# Patient Record
Sex: MALE | Race: WHITE | NOT HISPANIC OR LATINO | Employment: OTHER | URBAN - METROPOLITAN AREA
[De-identification: names, ages, dates, MRNs, and addresses within clinical notes are randomized per-mention and may not be internally consistent; named-entity substitution may affect disease eponyms.]

---

## 2021-10-01 ENCOUNTER — APPOINTMENT (OUTPATIENT)
Dept: RADIOLOGY | Age: 61
End: 2021-10-01
Payer: COMMERCIAL

## 2021-10-01 DIAGNOSIS — R05.9 COUGH: ICD-10-CM

## 2021-10-01 PROCEDURE — 71046 X-RAY EXAM CHEST 2 VIEWS: CPT

## 2021-10-12 ENCOUNTER — HOSPITAL ENCOUNTER (OUTPATIENT)
Dept: RADIOLOGY | Age: 61
Discharge: HOME | End: 2021-10-12
Payer: COMMERCIAL

## 2021-10-12 DIAGNOSIS — R91.8 LUNG NODULES: ICD-10-CM

## 2021-10-12 PROCEDURE — 71260 CT THORAX DX C+: CPT

## 2021-10-12 PROCEDURE — G1004 CDSM NDSC: HCPCS

## 2021-10-12 RX ADMIN — IOHEXOL 85 ML: 350 INJECTION, SOLUTION INTRAVENOUS at 08:14

## 2021-10-20 ENCOUNTER — HOSPITAL ENCOUNTER (OUTPATIENT)
Dept: RADIOLOGY | Age: 61
Discharge: HOME | End: 2021-10-20
Payer: COMMERCIAL

## 2021-10-20 DIAGNOSIS — E04.9 ENLARGEMENT OF THYROID: ICD-10-CM

## 2021-10-20 PROCEDURE — 76536 US EXAM OF HEAD AND NECK: CPT

## 2021-11-04 PROBLEM — H61.23 BILATERAL IMPACTED CERUMEN: Status: ACTIVE | Noted: 2021-11-04

## 2021-11-04 PROBLEM — E04.2 MULTINODULAR THYROID: Status: ACTIVE | Noted: 2021-11-04

## 2021-11-05 ENCOUNTER — TELEPHONE (OUTPATIENT)
Dept: HEMATOLOGY ONCOLOGY | Facility: CLINIC | Age: 61
End: 2021-11-05

## 2021-11-16 ENCOUNTER — OFFICE VISIT (OUTPATIENT)
Dept: CARDIAC SURGERY | Facility: CLINIC | Age: 61
End: 2021-11-16
Payer: COMMERCIAL

## 2021-11-16 VITALS
BODY MASS INDEX: 32.3 KG/M2 | OXYGEN SATURATION: 97 % | WEIGHT: 265.21 LBS | HEIGHT: 76 IN | TEMPERATURE: 97.2 F | HEART RATE: 67 BPM | DIASTOLIC BLOOD PRESSURE: 90 MMHG | SYSTOLIC BLOOD PRESSURE: 150 MMHG | RESPIRATION RATE: 16 BRPM

## 2021-11-16 DIAGNOSIS — E04.2 MULTINODULAR THYROID: Primary | ICD-10-CM

## 2021-11-16 PROCEDURE — 99205 OFFICE O/P NEW HI 60 MIN: CPT | Performed by: PHYSICIAN ASSISTANT

## 2021-12-06 ENCOUNTER — APPOINTMENT (OUTPATIENT)
Dept: LAB | Age: 61
End: 2021-12-06
Payer: COMMERCIAL

## 2021-12-06 DIAGNOSIS — E04.9 SUBSTERNAL GOITER: ICD-10-CM

## 2021-12-06 DIAGNOSIS — I10 ESSENTIAL HYPERTENSION, MALIGNANT: ICD-10-CM

## 2021-12-06 DIAGNOSIS — E04.9 ENLARGEMENT OF THYROID: ICD-10-CM

## 2021-12-06 DIAGNOSIS — E11.649 UNCONTROLLED TYPE 2 DIABETES MELLITUS WITH HYPOGLYCEMIA, UNSPECIFIED HYPOGLYCEMIA COMA STATUS (HCC): ICD-10-CM

## 2021-12-06 DIAGNOSIS — E78.5 HYPERLIPIDEMIA, UNSPECIFIED HYPERLIPIDEMIA TYPE: ICD-10-CM

## 2021-12-06 LAB
ALBUMIN SERPL BCP-MCNC: 3.7 G/DL (ref 3.5–5)
ALP SERPL-CCNC: 54 U/L (ref 46–116)
ALT SERPL W P-5'-P-CCNC: 44 U/L (ref 12–78)
ANION GAP SERPL CALCULATED.3IONS-SCNC: 7 MMOL/L (ref 4–13)
AST SERPL W P-5'-P-CCNC: 19 U/L (ref 5–45)
BASOPHILS # BLD AUTO: 0.06 THOUSANDS/ΜL (ref 0–0.1)
BASOPHILS NFR BLD AUTO: 1 % (ref 0–1)
BILIRUB SERPL-MCNC: 1.2 MG/DL (ref 0.2–1)
BUN SERPL-MCNC: 14 MG/DL (ref 5–25)
CALCIUM SERPL-MCNC: 10 MG/DL (ref 8.3–10.1)
CHLORIDE SERPL-SCNC: 102 MMOL/L (ref 100–108)
CHOLEST SERPL-MCNC: 131 MG/DL
CO2 SERPL-SCNC: 26 MMOL/L (ref 21–32)
CREAT SERPL-MCNC: 0.68 MG/DL (ref 0.6–1.3)
EOSINOPHIL # BLD AUTO: 0.24 THOUSAND/ΜL (ref 0–0.61)
EOSINOPHIL NFR BLD AUTO: 4 % (ref 0–6)
ERYTHROCYTE [DISTWIDTH] IN BLOOD BY AUTOMATED COUNT: 13 % (ref 11.6–15.1)
EST. AVERAGE GLUCOSE BLD GHB EST-MCNC: 134 MG/DL
GFR SERPL CREATININE-BSD FRML MDRD: 102 ML/MIN/1.73SQ M
GLUCOSE P FAST SERPL-MCNC: 169 MG/DL (ref 65–99)
HBA1C MFR BLD: 6.3 %
HCT VFR BLD AUTO: 42.5 % (ref 36.5–49.3)
HDLC SERPL-MCNC: 40 MG/DL
HGB BLD-MCNC: 15.1 G/DL (ref 12–17)
IMM GRANULOCYTES # BLD AUTO: 0.01 THOUSAND/UL (ref 0–0.2)
IMM GRANULOCYTES NFR BLD AUTO: 0 % (ref 0–2)
LDLC SERPL CALC-MCNC: 60 MG/DL (ref 0–100)
LYMPHOCYTES # BLD AUTO: 1.29 THOUSANDS/ΜL (ref 0.6–4.47)
LYMPHOCYTES NFR BLD AUTO: 23 % (ref 14–44)
MCH RBC QN AUTO: 31.2 PG (ref 26.8–34.3)
MCHC RBC AUTO-ENTMCNC: 35.5 G/DL (ref 31.4–37.4)
MCV RBC AUTO: 88 FL (ref 82–98)
MONOCYTES # BLD AUTO: 0.52 THOUSAND/ΜL (ref 0.17–1.22)
MONOCYTES NFR BLD AUTO: 9 % (ref 4–12)
NEUTROPHILS # BLD AUTO: 3.56 THOUSANDS/ΜL (ref 1.85–7.62)
NEUTS SEG NFR BLD AUTO: 63 % (ref 43–75)
NONHDLC SERPL-MCNC: 91 MG/DL
NRBC BLD AUTO-RTO: 0 /100 WBCS
PLATELET # BLD AUTO: 179 THOUSANDS/UL (ref 149–390)
PMV BLD AUTO: 11 FL (ref 8.9–12.7)
POTASSIUM SERPL-SCNC: 3.6 MMOL/L (ref 3.5–5.3)
PROT SERPL-MCNC: 7 G/DL (ref 6.4–8.2)
RBC # BLD AUTO: 4.84 MILLION/UL (ref 3.88–5.62)
SODIUM SERPL-SCNC: 135 MMOL/L (ref 136–145)
TRIGL SERPL-MCNC: 153 MG/DL
WBC # BLD AUTO: 5.68 THOUSAND/UL (ref 4.31–10.16)

## 2021-12-06 PROCEDURE — 36415 COLL VENOUS BLD VENIPUNCTURE: CPT

## 2021-12-06 PROCEDURE — 85025 COMPLETE CBC W/AUTO DIFF WBC: CPT

## 2021-12-06 PROCEDURE — 80053 COMPREHEN METABOLIC PANEL: CPT

## 2021-12-06 PROCEDURE — 83036 HEMOGLOBIN GLYCOSYLATED A1C: CPT

## 2021-12-06 PROCEDURE — 80061 LIPID PANEL: CPT

## 2021-12-16 ENCOUNTER — ANESTHESIA EVENT (OUTPATIENT)
Dept: PERIOP | Facility: HOSPITAL | Age: 61
End: 2021-12-16
Payer: COMMERCIAL

## 2021-12-17 ENCOUNTER — ANESTHESIA (OUTPATIENT)
Dept: PERIOP | Facility: HOSPITAL | Age: 61
End: 2021-12-17
Payer: COMMERCIAL

## 2021-12-17 ENCOUNTER — HOSPITAL ENCOUNTER (OUTPATIENT)
Facility: HOSPITAL | Age: 61
Setting detail: OUTPATIENT SURGERY
Discharge: HOME | End: 2021-12-18
Attending: SPECIALIST | Admitting: SPECIALIST
Payer: COMMERCIAL

## 2021-12-17 DIAGNOSIS — E04.9 SUBSTERNAL GOITER: Primary | ICD-10-CM

## 2021-12-17 DIAGNOSIS — I10 HYPERTENSION, UNSPECIFIED TYPE: ICD-10-CM

## 2021-12-17 PROBLEM — E66.811 CLASS 1 OBESITY IN ADULT: Status: ACTIVE | Noted: 2021-12-17

## 2021-12-17 PROBLEM — E78.5 HYPERLIPIDEMIA: Status: ACTIVE | Noted: 2021-12-17

## 2021-12-17 PROBLEM — E66.9 CLASS 1 OBESITY IN ADULT: Status: ACTIVE | Noted: 2021-12-17

## 2021-12-17 PROBLEM — E11.9 DIABETES MELLITUS, TYPE 2 (HCC): Status: ACTIVE | Noted: 2021-12-17

## 2021-12-17 LAB
GLUCOSE SERPL-MCNC: 155 MG/DL (ref 65–140)
GLUCOSE SERPL-MCNC: 155 MG/DL (ref 65–140)

## 2021-12-17 PROCEDURE — 60271 REMOVAL OF THYROID: CPT | Performed by: SPECIALIST

## 2021-12-17 PROCEDURE — 82948 REAGENT STRIP/BLOOD GLUCOSE: CPT

## 2021-12-17 PROCEDURE — 88307 TISSUE EXAM BY PATHOLOGIST: CPT | Performed by: PATHOLOGY

## 2021-12-17 RX ORDER — LIDOCAINE HYDROCHLORIDE 20 MG/ML
INJECTION, SOLUTION EPIDURAL; INFILTRATION; INTRACAUDAL; PERINEURAL AS NEEDED
Status: DISCONTINUED | OUTPATIENT
Start: 2021-12-17 | End: 2021-12-17

## 2021-12-17 RX ORDER — METOCLOPRAMIDE HYDROCHLORIDE 5 MG/ML
10 INJECTION INTRAMUSCULAR; INTRAVENOUS ONCE AS NEEDED
Status: DISCONTINUED | OUTPATIENT
Start: 2021-12-17 | End: 2021-12-17 | Stop reason: HOSPADM

## 2021-12-17 RX ORDER — NEOSTIGMINE METHYLSULFATE 1 MG/ML
INJECTION INTRAVENOUS AS NEEDED
Status: DISCONTINUED | OUTPATIENT
Start: 2021-12-17 | End: 2021-12-17

## 2021-12-17 RX ORDER — SUCCINYLCHOLINE/SOD CL,ISO/PF 100 MG/5ML
SYRINGE (ML) INTRAVENOUS AS NEEDED
Status: DISCONTINUED | OUTPATIENT
Start: 2021-12-17 | End: 2021-12-17

## 2021-12-17 RX ORDER — SODIUM CHLORIDE, SODIUM LACTATE, POTASSIUM CHLORIDE, CALCIUM CHLORIDE 600; 310; 30; 20 MG/100ML; MG/100ML; MG/100ML; MG/100ML
75 INJECTION, SOLUTION INTRAVENOUS CONTINUOUS
Status: DISCONTINUED | OUTPATIENT
Start: 2021-12-17 | End: 2021-12-17

## 2021-12-17 RX ORDER — LIDOCAINE HYDROCHLORIDE AND EPINEPHRINE 10; 10 MG/ML; UG/ML
INJECTION, SOLUTION INFILTRATION; PERINEURAL AS NEEDED
Status: DISCONTINUED | OUTPATIENT
Start: 2021-12-17 | End: 2021-12-17 | Stop reason: HOSPADM

## 2021-12-17 RX ORDER — IBUPROFEN 400 MG/1
400 TABLET ORAL EVERY 6 HOURS PRN
Status: DISCONTINUED | OUTPATIENT
Start: 2021-12-17 | End: 2021-12-18 | Stop reason: HOSPADM

## 2021-12-17 RX ORDER — OXYCODONE HYDROCHLORIDE 10 MG/1
10 TABLET ORAL EVERY 4 HOURS PRN
Status: DISCONTINUED | OUTPATIENT
Start: 2021-12-17 | End: 2021-12-18 | Stop reason: HOSPADM

## 2021-12-17 RX ORDER — ONDANSETRON 2 MG/ML
INJECTION INTRAMUSCULAR; INTRAVENOUS AS NEEDED
Status: DISCONTINUED | OUTPATIENT
Start: 2021-12-17 | End: 2021-12-17

## 2021-12-17 RX ORDER — SODIUM CHLORIDE, SODIUM LACTATE, POTASSIUM CHLORIDE, CALCIUM CHLORIDE 600; 310; 30; 20 MG/100ML; MG/100ML; MG/100ML; MG/100ML
125 INJECTION, SOLUTION INTRAVENOUS CONTINUOUS
Status: DISCONTINUED | OUTPATIENT
Start: 2021-12-17 | End: 2021-12-17

## 2021-12-17 RX ORDER — FENTANYL CITRATE/PF 50 MCG/ML
25 SYRINGE (ML) INJECTION
Status: DISCONTINUED | OUTPATIENT
Start: 2021-12-17 | End: 2021-12-17 | Stop reason: HOSPADM

## 2021-12-17 RX ORDER — DEXAMETHASONE SODIUM PHOSPHATE 10 MG/ML
INJECTION, SOLUTION INTRAMUSCULAR; INTRAVENOUS AS NEEDED
Status: DISCONTINUED | OUTPATIENT
Start: 2021-12-17 | End: 2021-12-17

## 2021-12-17 RX ORDER — GLYCOPYRROLATE 0.2 MG/ML
INJECTION INTRAMUSCULAR; INTRAVENOUS AS NEEDED
Status: DISCONTINUED | OUTPATIENT
Start: 2021-12-17 | End: 2021-12-17

## 2021-12-17 RX ORDER — EPHEDRINE SULFATE 50 MG/ML
INJECTION INTRAVENOUS AS NEEDED
Status: DISCONTINUED | OUTPATIENT
Start: 2021-12-17 | End: 2021-12-17

## 2021-12-17 RX ORDER — FENTANYL CITRATE 50 UG/ML
INJECTION, SOLUTION INTRAMUSCULAR; INTRAVENOUS AS NEEDED
Status: DISCONTINUED | OUTPATIENT
Start: 2021-12-17 | End: 2021-12-17

## 2021-12-17 RX ORDER — LIDOCAINE HYDROCHLORIDE 10 MG/ML
0.5 INJECTION, SOLUTION EPIDURAL; INFILTRATION; INTRACAUDAL; PERINEURAL ONCE AS NEEDED
Status: DISCONTINUED | OUTPATIENT
Start: 2021-12-17 | End: 2021-12-17 | Stop reason: HOSPADM

## 2021-12-17 RX ORDER — B-COMPLEX WITH VITAMIN C
2 TABLET ORAL
Status: DISCONTINUED | OUTPATIENT
Start: 2021-12-17 | End: 2021-12-18 | Stop reason: HOSPADM

## 2021-12-17 RX ORDER — PROPOFOL 10 MG/ML
INJECTION, EMULSION INTRAVENOUS AS NEEDED
Status: DISCONTINUED | OUTPATIENT
Start: 2021-12-17 | End: 2021-12-17

## 2021-12-17 RX ORDER — ROCURONIUM BROMIDE 10 MG/ML
INJECTION, SOLUTION INTRAVENOUS AS NEEDED
Status: DISCONTINUED | OUTPATIENT
Start: 2021-12-17 | End: 2021-12-17

## 2021-12-17 RX ORDER — HYDROMORPHONE HCL/PF 1 MG/ML
0.5 SYRINGE (ML) INJECTION
Status: DISCONTINUED | OUTPATIENT
Start: 2021-12-17 | End: 2021-12-17 | Stop reason: HOSPADM

## 2021-12-17 RX ORDER — MAGNESIUM HYDROXIDE 1200 MG/15ML
LIQUID ORAL AS NEEDED
Status: DISCONTINUED | OUTPATIENT
Start: 2021-12-17 | End: 2021-12-17 | Stop reason: HOSPADM

## 2021-12-17 RX ORDER — ACETAMINOPHEN 325 MG/1
650 TABLET ORAL EVERY 6 HOURS PRN
Status: DISCONTINUED | OUTPATIENT
Start: 2021-12-17 | End: 2021-12-18 | Stop reason: HOSPADM

## 2021-12-17 RX ORDER — MIDAZOLAM HYDROCHLORIDE 2 MG/2ML
INJECTION, SOLUTION INTRAMUSCULAR; INTRAVENOUS AS NEEDED
Status: DISCONTINUED | OUTPATIENT
Start: 2021-12-17 | End: 2021-12-17

## 2021-12-17 RX ORDER — GINSENG 100 MG
1 CAPSULE ORAL 2 TIMES DAILY
Status: DISCONTINUED | OUTPATIENT
Start: 2021-12-17 | End: 2021-12-18 | Stop reason: HOSPADM

## 2021-12-17 RX ADMIN — BACITRACIN ZINC 1 SMALL APPLICATION: 500 OINTMENT TOPICAL at 18:37

## 2021-12-17 RX ADMIN — FENTANYL CITRATE 25 MCG: 50 INJECTION INTRAMUSCULAR; INTRAVENOUS at 13:23

## 2021-12-17 RX ADMIN — GLYCOPYRROLATE 0.4 MG: 0.2 INJECTION, SOLUTION INTRAMUSCULAR; INTRAVENOUS at 12:37

## 2021-12-17 RX ADMIN — NEOSTIGMINE METHYLSULFATE 3 MG: 1 INJECTION INTRAVENOUS at 12:37

## 2021-12-17 RX ADMIN — MIDAZOLAM HYDROCHLORIDE 2 MG: 1 INJECTION, SOLUTION INTRAMUSCULAR; INTRAVENOUS at 10:22

## 2021-12-17 RX ADMIN — ROCURONIUM BROMIDE 5 MG: 10 SOLUTION INTRAVENOUS at 10:32

## 2021-12-17 RX ADMIN — DEXAMETHASONE SODIUM PHOSPHATE 10 MG: 10 INJECTION, SOLUTION INTRAMUSCULAR; INTRAVENOUS at 10:32

## 2021-12-17 RX ADMIN — FENTANYL CITRATE 100 MCG: 50 INJECTION, SOLUTION INTRAMUSCULAR; INTRAVENOUS at 10:32

## 2021-12-17 RX ADMIN — ONDANSETRON 4 MG: 2 INJECTION INTRAMUSCULAR; INTRAVENOUS at 10:32

## 2021-12-17 RX ADMIN — EPHEDRINE SULFATE 10 MG: 50 INJECTION, SOLUTION INTRAVENOUS at 10:55

## 2021-12-17 RX ADMIN — EPHEDRINE SULFATE 10 MG: 50 INJECTION, SOLUTION INTRAVENOUS at 11:09

## 2021-12-17 RX ADMIN — Medication 200 MG: at 10:32

## 2021-12-17 RX ADMIN — METFORMIN HYDROCHLORIDE 1000 MG: 500 TABLET, FILM COATED ORAL at 18:35

## 2021-12-17 RX ADMIN — ENOXAPARIN SODIUM 40 MG: 40 INJECTION SUBCUTANEOUS at 17:26

## 2021-12-17 RX ADMIN — FENTANYL CITRATE 25 MCG: 50 INJECTION INTRAMUSCULAR; INTRAVENOUS at 13:19

## 2021-12-17 RX ADMIN — EPHEDRINE SULFATE 10 MG: 50 INJECTION, SOLUTION INTRAVENOUS at 11:36

## 2021-12-17 RX ADMIN — LIDOCAINE HYDROCHLORIDE 100 MG: 20 INJECTION, SOLUTION EPIDURAL; INFILTRATION; INTRACAUDAL at 10:32

## 2021-12-17 RX ADMIN — ROCURONIUM BROMIDE 30 MG: 10 SOLUTION INTRAVENOUS at 10:45

## 2021-12-17 RX ADMIN — SODIUM CHLORIDE, SODIUM LACTATE, POTASSIUM CHLORIDE, AND CALCIUM CHLORIDE: .6; .31; .03; .02 INJECTION, SOLUTION INTRAVENOUS at 10:12

## 2021-12-17 RX ADMIN — SODIUM CHLORIDE, SODIUM LACTATE, POTASSIUM CHLORIDE, AND CALCIUM CHLORIDE: .6; .31; .03; .02 INJECTION, SOLUTION INTRAVENOUS at 10:35

## 2021-12-17 RX ADMIN — Medication 2 TABLET: at 18:36

## 2021-12-17 RX ADMIN — PROPOFOL 200 MG: 10 INJECTION, EMULSION INTRAVENOUS at 10:32

## 2021-12-18 VITALS
WEIGHT: 265 LBS | SYSTOLIC BLOOD PRESSURE: 142 MMHG | HEIGHT: 76 IN | BODY MASS INDEX: 32.27 KG/M2 | TEMPERATURE: 98 F | RESPIRATION RATE: 18 BRPM | OXYGEN SATURATION: 95 % | DIASTOLIC BLOOD PRESSURE: 67 MMHG | HEART RATE: 62 BPM

## 2021-12-18 RX ORDER — LEVOTHYROXINE SODIUM 0.15 MG/1
150 TABLET ORAL DAILY
Qty: 30 TABLET | Refills: 0 | Status: SHIPPED | OUTPATIENT
Start: 2021-12-18 | End: 2022-01-10 | Stop reason: SDUPTHER

## 2021-12-18 RX ORDER — ACETAMINOPHEN 325 MG/1
TABLET ORAL
Qty: 30 TABLET | Refills: 0
Start: 2021-12-18

## 2021-12-18 RX ORDER — IBUPROFEN 200 MG
400 TABLET ORAL EVERY 6 HOURS PRN
Qty: 120 TABLET | Refills: 0
Start: 2021-12-18 | End: 2022-01-17

## 2021-12-18 RX ORDER — ASPIRIN 81 MG/1
81 TABLET ORAL DAILY
Refills: 0
Start: 2021-12-20

## 2021-12-18 RX ORDER — B-COMPLEX WITH VITAMIN C
2 TABLET ORAL 3 TIMES DAILY
Qty: 180 TABLET | Refills: 0 | Status: SHIPPED | OUTPATIENT
Start: 2021-12-18 | End: 2022-01-17

## 2021-12-18 RX ADMIN — Medication 2 TABLET: at 11:12

## 2021-12-18 RX ADMIN — BACITRACIN ZINC 1 SMALL APPLICATION: 500 OINTMENT TOPICAL at 09:29

## 2021-12-18 RX ADMIN — OXYCODONE HYDROCHLORIDE 10 MG: 10 TABLET ORAL at 11:12

## 2021-12-18 RX ADMIN — ENOXAPARIN SODIUM 40 MG: 40 INJECTION SUBCUTANEOUS at 09:29

## 2021-12-18 RX ADMIN — Medication 2 TABLET: at 07:39

## 2021-12-18 RX ADMIN — METFORMIN HYDROCHLORIDE 1000 MG: 500 TABLET, FILM COATED ORAL at 07:39

## 2021-12-18 RX ADMIN — OXYCODONE HYDROCHLORIDE 10 MG: 10 TABLET ORAL at 07:39

## 2021-12-23 PROBLEM — E89.0 POST-OPERATIVE HYPOTHYROIDISM: Status: ACTIVE | Noted: 2021-12-23

## 2022-01-10 DIAGNOSIS — E04.9 SUBSTERNAL GOITER: ICD-10-CM

## 2022-01-10 RX ORDER — LEVOTHYROXINE SODIUM 0.15 MG/1
150 TABLET ORAL DAILY
Qty: 30 TABLET | Refills: 0 | Status: SHIPPED | OUTPATIENT
Start: 2022-01-10 | End: 2022-01-31 | Stop reason: DRUGHIGH

## 2022-01-28 ENCOUNTER — APPOINTMENT (OUTPATIENT)
Dept: LAB | Age: 62
End: 2022-01-28
Payer: COMMERCIAL

## 2022-01-28 DIAGNOSIS — E89.0 POST-OPERATIVE HYPOTHYROIDISM: ICD-10-CM

## 2022-01-28 LAB
T4 FREE SERPL-MCNC: 1 NG/DL (ref 0.76–1.46)
T4 SERPL-MCNC: 9.3 UG/DL (ref 4.7–13.3)
TSH SERPL DL<=0.05 MIU/L-ACNC: 5.43 UIU/ML (ref 0.36–3.74)

## 2022-01-28 PROCEDURE — 84439 ASSAY OF FREE THYROXINE: CPT

## 2022-01-28 PROCEDURE — 84443 ASSAY THYROID STIM HORMONE: CPT

## 2022-01-28 PROCEDURE — 36415 COLL VENOUS BLD VENIPUNCTURE: CPT

## 2022-01-31 DIAGNOSIS — E89.0 POST-OPERATIVE HYPOTHYROIDISM: Primary | ICD-10-CM

## 2022-01-31 DIAGNOSIS — E83.51 HYPOCALCEMIA: ICD-10-CM

## 2022-01-31 DIAGNOSIS — E55.9 VITAMIN D DEFICIENCY: ICD-10-CM

## 2022-01-31 RX ORDER — LEVOTHYROXINE SODIUM 175 UG/1
175 TABLET ORAL DAILY
Qty: 30 TABLET | Refills: 2 | Status: SHIPPED | OUTPATIENT
Start: 2022-01-31 | End: 2022-02-23 | Stop reason: SDUPTHER

## 2022-01-31 NOTE — PROGRESS NOTES
Discussed with pt via phone  TSH level elevated 5 4  To increase Levothyroxine dose  Repeat level in 6 to 8 weeks  He continues on calcium supplement    Check calcium level next lab draw

## 2022-03-13 ENCOUNTER — APPOINTMENT (OUTPATIENT)
Dept: LAB | Age: 62
End: 2022-03-13
Payer: COMMERCIAL

## 2022-03-13 DIAGNOSIS — E55.9 VITAMIN D DEFICIENCY: ICD-10-CM

## 2022-03-13 DIAGNOSIS — E89.0 POST-OPERATIVE HYPOTHYROIDISM: ICD-10-CM

## 2022-03-13 DIAGNOSIS — E83.51 HYPOCALCEMIA: ICD-10-CM

## 2022-03-13 LAB
25(OH)D3 SERPL-MCNC: 43.3 NG/ML (ref 30–100)
CALCIUM SERPL-MCNC: 9.2 MG/DL (ref 8.3–10.1)
PTH-INTACT SERPL-MCNC: 18.9 PG/ML (ref 18.4–80.1)
T4 FREE SERPL-MCNC: 1.05 NG/DL (ref 0.76–1.46)
T4 SERPL-MCNC: 9.1 UG/DL (ref 4.7–13.3)
TSH SERPL DL<=0.05 MIU/L-ACNC: 4.6 UIU/ML (ref 0.36–3.74)

## 2022-03-13 PROCEDURE — 36415 COLL VENOUS BLD VENIPUNCTURE: CPT

## 2022-03-13 PROCEDURE — 84439 ASSAY OF FREE THYROXINE: CPT

## 2022-03-13 PROCEDURE — 84443 ASSAY THYROID STIM HORMONE: CPT

## 2022-03-13 PROCEDURE — 83970 ASSAY OF PARATHORMONE: CPT

## 2022-03-13 PROCEDURE — 82310 ASSAY OF CALCIUM: CPT

## 2022-03-13 PROCEDURE — 82306 VITAMIN D 25 HYDROXY: CPT

## 2022-04-05 ENCOUNTER — EVALUATION (OUTPATIENT)
Dept: SPEECH THERAPY | Facility: CLINIC | Age: 62
End: 2022-04-05
Payer: COMMERCIAL

## 2022-04-05 DIAGNOSIS — R49.0 HOARSENESS: ICD-10-CM

## 2022-04-05 DIAGNOSIS — R49.0 DYSPHONIA: Primary | ICD-10-CM

## 2022-04-05 DIAGNOSIS — J38.01 PARALYSIS OF LEFT VOCAL CORD: ICD-10-CM

## 2022-04-05 PROCEDURE — 92524 BEHAVRAL QUALIT ANALYS VOICE: CPT

## 2022-04-05 PROCEDURE — 92507 TX SP LANG VOICE COMM INDIV: CPT

## 2022-04-05 NOTE — PROGRESS NOTES
Speech-Language Pathology Initial Evaluation    Today's date: 2022  Patients name: Kyler Swann  : 1960  MRN: [id number]  Safety measures: N/A  Referring provider: Brian Alfonso MD    Primary diagnosis/billing code: R 49 0  Secondary diagnosis/billing code: J38 01    Visit tracking:  -Referring provider: Epic  -Billing guidelines: AMA  -Visit #1  -Insurance: [employer] 81  due 2022    Subjective comments:   Patient is accompanied by his wife, Elias Hirsch today  Patient has experienced hoarseness following thyroidectomy (21)  The ENT told him that his left vocal fold is still paralysed  Wife reports that it appears as if he has trouble catching his breath at times, particularly when he is talking too long  His doctor is going to start him on a new blood pressure medication (in two weeks) which he has noticed has helped other patients in resolution of nerve damage (resulting in vocal cord paralysis)  The medication is called niMODipine  How did the patient hear about us? Physician    Patient's goal(s): To get my voice back to normal     Reason for referral: Change in vocal quality  Prior functional status: Communication effective and appropriate in all situations  Clinically complex situations: N/A    History: Patient is a 59 y o  male who was referred to outpatient skilled Speech Therapy services for a voice evaluation  Patient underwent a total thyroidectomy on 21  Upon follow-up laryngoscopy (3/31/22), patient demonstrated good mobility of right true vocal fold with mild edema and left true vocal fold paralysis  Patient has since experienced resulting hoarseness  Prior medical history of the following: multinodular thyroid, hypertension and diabetes melitus (type II)      Hearing: WFL  Vision: Life long glass wearer    Home environment/lifestyle: Lives with wife  Highest level of education: college  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, work around the house    Mental status: Alert  Behavior status: Cooperative  Communication modalities: Verbal  Rehabilitation prognosis: Good rehab potential to reach the established goals    Assessments    VOICE EVALUATION:  -Chief complaint: Hoarseness    -Onset: Sudden (beginnin21) - voice is the same, perhaps "just a little bit worse" following thyroidectomy    -Voice history: Allergies, Thyroid problems and Surgeries (total thyroidectomy)     -Occupational/vocal demands: retired    -Water intake: 32-48 oz    -Caffeine intake: coffee - 3 cups in the morning (24 oz)    -Alcohol intake: 10 beers in a week    -Smoking?: Quite over 20 years ago    -Throat clearing/coughing?: once a day throat clearing    -Previous voice tx?: None    -Other History - N/A      1  Voice Handicap Index (VHI): The VHI is a list of 30 statements that many people have used to describe their voices and the effects of their voices on their lives  Patient indicated how frequently he has the same experience using a rating point scale (never = 0, almost never = 1, sometimes = 2, almost always = 3, and always = 4)  Patient's results were as follows:    Subscale: Score: Self-Perceived Impairment Level:   Physical 18/40 Moderate   Functional 17/40 Moderate   Emotional 9/40 Mild        TOTAL 44/120 Moderate       PERCEPTUAL VOICE ASSESSMENT:    2  Consensus Auditory Perceptual Evaluation of Voice (CAPE-V): The CAPE-V rates auditory-perceptual qualities of a patients voice  The overall severity, roughness, breathiness, strain, pitch and loudness are rated during several tasks including general conversation, vowel prolongation, and reading of sentences  Patient also read the [place] to assess the coordination of respiration and voice production   The ratings of the abovementioned parameters were plotted on a 100-millimeter scale, with 0 corresponding to typical normal voice and 100 indicating a severely deviant voice  Parameter: Rating: Severity & Perceptual Observations:   *Overall Severity Roughness 56/100 moderate to severe   Roughness 61/100 moderate to severe   Breathiness 12/100 Mild   Strain 44/100 Moderate   Pitch 26/100 Mild to moderate   Loudness 26/100 Mild to Moderate   Focus of Resonance normal WNL       RESPIRATORY EFFICIENCY:   3  S:Z Ratio Task: Patient was instructed to sustain the sounds /s/ and /z/ across three trials to examine the coordination and efficiency of respiration and voice production  Normative data suggests that adults can prolong these sounds for 20-25 seconds  Ratios of 1 4 and above are consistent with laryngeal inefficiency, and ratios of 2 0 and above are suggestive of vocal fold pathology  Task: Trial 1 (sec): Trial 2 (sec): Trial 3 (sec):   /s/ 14 12 16 05 13 03   /z/ 6 35 6 72 6 89     Best /s/: 16 05  Best /z/: 6 89      Ratio: 2 33      4  Maximum Phonation Time: Patient was instructed to sustain the sound /ah/ across three trials to measure respiratory and laryngeal coordination and efficiency  Adults are typically able to prolong these vowels sound for 15-20 seconds  Reduced MPT may suggest poor respiratory support, or poor medial glottal closure  Trial 1 (sec): Trial 2 (sec): Trial 3 (sec):   3 08 3 57 4 13     Average Duration: 3 59 seconds      MEASURES OF PITCH:  The Visi-Pitch IV, a computer-driven voice analysis program, was used to collect objective voice data using the Visi-Pitch Multidimensional Voice Program (MDVP) & Real Time Pitch Program (MTPP)  5  Multi-Dimensional Voice Profile (MDVP):  This is obtained on the phonation of the sound /a/, in which dimensions of the voice, including frequncy perturbations, amplitude perturbations, variations in F0, noise to harmonic ratio, voice turbulence Index, soft phonation Index, tremours in frequency and amplitude, degree of subharmonics, and degree of voicing apart from the frequency and amplitude, are reflected  Normative Data:   Mean Fundamental Frequency (F0) 129 825 Hz 145 22 Hz   Jitter (RAP) 5 886% 0 345%   Shimmer 38 919% 2 523%   Iildv-am-Xgzyopo[place] Ratio (NHR) 0 508 0 122       6  Habitual Pitch: Patient was instructed to engage in two different speaking tasks (counting and reading) to calculate the pitch he uses most frequently  Task: Mean F0 (Hz) Min-Max Range (Hz) Normative Data:   Speaking (counting 1-10) 130 81 Hz 94 8 Hz -259 75 Hz 128 Hz (100 Hz -150 Hz)   Reading ("[place]) 148 42 Hz 70 94 Hz -257 85 Hz 128 Hz (100 Hz -150 Hz)       7  Maximal Phonational Frequency Range: Patient was instructed to voice from lowest to highest notes  Task Min-Max Range (Hz) Normative Data:   Gliding 67 22 Hz -218 11 Hz 77 Hz-576 Hz           Goals    Short-term goals:  Patient will be educated on vocal hygiene (increase H2O, decrease caffeine and alcohol intake) and demonstrate understanding of recommendations to facilitate increased quality of voice, to be achieved in 2-4 weeks  Patient will decrease laryngeal and articulatory muscle tension by independently completing relaxation/stretching exercises, to be achieved in 6-8 weeks  Patient will be educated on diaphragmatic breathing (versus clavicular breathing) to establish controlled, rhythmic breathing, to be achieved in 2-4 weeks  Patient will utilize diaphragmatic breathing during oral sentence/paragraph reading in 80% of opportunities to facilitate increased breath support for voice/speaking, decrease laryngeal effort, and improve glottic closure to be achieved in 4-6 weeks  Patient will demonstrate the ability to discriminate the target voice (coordinated voice production) from the uncoordinated voice by counting, reading, speaking in phrases and rating each voice production correctly (coordinated vs uncoordinated voice) with 80% accuracy       Patient will utilize semi-occluded vocal tract exercises without laryngeal tension to promote healthy vocal function with 80% accuracy, to be achieved in 4-6 weeks  Patient will utilize a forward tone focused/resonant voice to decrease vocal hyperfunction and improve voice quality and vocal projection, to be achieved in 4-6 weeks  Patient will produce easy resonant voice / flow phonation on phonemes, words, phrases and chanting with 80% accuracy, to be achieved in 4-6 weeks  Patient will increase loudness using RV or Flow Phonation with messa di voce in trials with 80% accuracy, to be achieved in 4-6 weeks  Patient will produce coordinated voice production in different situations (e g  telephone, over background noise, emotional topics, speaking at a distance) with 80% accuracy as perceptually judged by clinician  Long-term goals:    Patient will improve vocal quality for increased functional communication by discharge  To maximize vocal effectiveness 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 and to return to vocal activities of daily living  Impressions/Recommendations    Impressions:   -Patient presents with a moderate to severely dysphonic voice characterized by roughness, strain, breathiness, pitch breaks and reduced loudness  Patient's breathing is clavicular/thoracic in nature, requiring education in diaphragmatic breathing  Diagnostic measurements support patient's diagnosis of left vocal fold paralysis as maximum phonation time is below normal limits, s/z ratio is high, and diagnostic parameters (e g  noise to harmonic ratio, jitter and shimmer) are not within standard norms  Voice therapy is recommended to reduce laryngeal tension, improve glottal closure, produce forward focused resonance and provide respiratory retraining for connected speech  Patient was educated on various phonotraumatic behaviors and vocal hygiene throughout assessment   Patient currently demonstrates decreased water and increased caffeine and alcohol intake, thoracic/clavicular breathing, and straining voice  Vocal hygiene strategies included increased water intake, decreased caffeine and alcohol intake, and increased breath support/diaphragmatic breathing  Patient was agreeable  Recommendations:  -Patient would benefit from outpatient skilled Speech Therapy services: Voice therapy    -Frequency: 1x weekly  -Duration: 6-8 weeks    -Intervention certification from: 6/8/6799  -Intervention certification to: 6/1/7742    -Intervention comments:   40 minutes of voice evaluation time; 20 minutes of patient and family education/speech therapy

## 2022-04-14 NOTE — PROGRESS NOTES
Daily Speech Treatment Note    Today's date: 2022  Patients name: Shannan Richards  : 1960  MRN: [id number]  Safety measures: N/A  Referring provider: Jessica Mcclain MD     Primary diagnosis/billing code: R 49 0  Secondary diagnosis/billing code: J38 01     Visit tracking:  -Referring provider: Epic  -Billing guidelines: AMA  -Visit #2  -Insurance: [employer] 81  due 2022    Subjective/Behavioral:  - Patient reports that he's been talking a lot and feels that his voice seems better  He has been practicing breathing but hasn't noticed much change  Objective/Assessment:  -Patient initially had difficulty finding forward focused resonance  Benefitted from Flow Phonation and relaxation exercises (neck and shoulder) to relieve tension and encourage easy onset phonation  He was able to find forward focused resonance (in his nose and eventually some feeling in lips)utilizing straw phonation (wide straw) Resonant Voice Therapy at phoneme level  Short-term goals:  Patient will be educated on vocal hygiene (increase H2O, decrease caffeine and alcohol intake) and demonstrate understanding of recommendations to facilitate increased quality of voice, to be achieved in 2-4 weeks  H2O - increased to over 64 oz  Caffeine - no change in intake (not intending to change)  Alcohol - significant over the past two weeks on his cruise     Patient will decrease laryngeal and articulatory muscle tension by independently completing relaxation/stretching exercises, to be achieved in 6-8 weeks  Patient was lead through a series of neck/shoulder exercises to reduce laryngeal tension  Performed with 100% accuracy  Patient will be educated on diaphragmatic breathing (versus clavicular breathing) to establish controlled, rhythmic breathing, to be achieved in 2-4 weeks  Diaphragmatic breathing was reviewed  Patient practiced on exhalation of sustained /s/   Tactile cueing was provided (e g  patient stood with back against wall and hand on abdomen)  Patient demonstrated engagement of abdominal muscles, in 70% of trials  Patient will utilize diaphragmatic breathing during oral sentence/paragraph reading in 80% of opportunities to facilitate increased breath support for voice/speaking, decrease laryngeal effort, and improve glottic closure to be achieved in 4-6 weeks      Patient will demonstrate the ability to discriminate the target voice (coordinated voice production) from the uncoordinated voice by counting, reading, speaking in phrases and rating each voice production correctly (coordinated vs uncoordinated voice) with 80% accuracy       Patient will utilize semi-occluded vocal tract exercises without laryngeal tension to promote healthy vocal function with 80% accuracy, to be achieved in 4-6 weeks     Semi-Occluded Vocal Tract Exercises  To target relaxed laryngeal posture and coordination between phonation and respiration, the following activities were completed using principles of SOVTE  Cup Bubbling (straw and cup with water): Without phonation: 100% accuracy  With phonation (humming):  Patient was not able to achieve coordination of breath with voice  Switched to wide straw phonation without cup  Straw Only  With phonation (humming):  Patient was able to achieve coordination of breath with voice 50% accuracy  Patient communicated that he felt it in the nose  As the therapy session continued, he was eventually able to feel some vibration in his lips  Patient will utilize a forward tone focused/resonant voice to decrease vocal hyperfunction and improve voice quality and vocal projection, to be achieved in 4-6 weeks  Patient demonstrated a forward focused, coordinated voice with 50% at phoneme level on /m/  Patient demonstrated a forward focused, coordinated voice with 50% on "mum"      Patient demonstrated a forward focused, coordinated voice with 50% on "mo"& "ma"     Intermittently utilized straw phonation to encourage forward focused placement  This was a successful strategy for the patient       Patient will produce easy resonant voice / flow phonation on phonemes, words, phrases and chanting with 80% accuracy, to be achieved in 4-6 weeks    Flow Phonation:  Phoneme (voiceless): 100% accuracy    Phoneme Level (voiced): 100% accuracy    Word Level (voiced): 70%    Phrase Level (voiced): 50%    Patient will increase loudness using RV or Flow Phonation with messa di voce in trials with 80% accuracy, to be achieved in 4-6 weeks       Patient will produce coordinated voice production in different situations (e g  telephone, over background noise, emotional topics, speaking at a distance) with 80% accuracy as perceptually judged by clinician  Plan:  -Patient was provided with home exercises/activities to target goals in plan of care at the end of today's session   -Continue with current plan of care

## 2022-04-19 ENCOUNTER — OFFICE VISIT (OUTPATIENT)
Dept: SPEECH THERAPY | Facility: CLINIC | Age: 62
End: 2022-04-19
Payer: COMMERCIAL

## 2022-04-19 DIAGNOSIS — R49.0 HOARSENESS: ICD-10-CM

## 2022-04-19 DIAGNOSIS — R49.0 DYSPHONIA: Primary | ICD-10-CM

## 2022-04-19 DIAGNOSIS — J38.01 PARALYSIS OF LEFT VOCAL CORD: ICD-10-CM

## 2022-04-19 PROCEDURE — 92507 TX SP LANG VOICE COMM INDIV: CPT

## 2022-04-25 NOTE — PROGRESS NOTES
Daily Speech Treatment Note    Today's date: 2022  Patients name: Vaishali Campbell  : 1960  MRN: [id number]  Safety measures: N/A  Referring provider: Negrita Bryant MD     Primary diagnosis/billing code: R 49 0  Secondary diagnosis/billing code: J38 01     Visit tracking:  -Referring provider: Epic  -Billing guidelines: AMA  -Visit #3  -[facility]  due 2022    Subjective/Behavioral:  - Patient reports that he is doing all right  No changes in voice  Practicing daily exercises and find most success with RVT  Objective/Assessment:  -Patient demonstrated improvement in producing forward focused resonance, aided by daily practice of resonance exercises  Patient demonstrated the ability to lower his pitch and produce forward focused vowel sounds at the 50% to 90% accuracy levels  RVT at phoneme and word level will be targeted during his next session  Short-term goals:  Patient will be educated on vocal hygiene (increase H2O, decrease caffeine and alcohol intake) and demonstrate understanding of recommendations to facilitate increased quality of voice, to be achieved in 2-4 weeks  H2O - Over 64 oz  Caffeine - remaining the same  Alcohol - down from vacation - 6 to 7 beers in a week (more on weekends)      Patient will decrease laryngeal and articulatory muscle tension by independently completing relaxation/stretching exercises, to be achieved in 6-8 weeks  Patient was lead through a series of neck/shoulder exercises to reduce laryngeal tension  Performed with 100% accuracy  Required min reminders to keep shoulders relaxed  Patient will be educated on diaphragmatic breathing (versus clavicular breathing) to establish controlled, rhythmic breathing, to be achieved in 2-4 weeks  Diaphragmatic breathing was reviewed  Patient practiced on exhalation of sustained /s/   Tactile cueing was provided (e g  patient stood with back against wall and hand on abdomen)  Patient demonstrated engagement of abdominal muscles, in 90% of trials  16 67 seconds max exhalation  Patient will utilize diaphragmatic breathing during oral sentence/paragraph reading in 80% of opportunities to facilitate increased breath support for voice/speaking, decrease laryngeal effort, and improve glottic closure to be achieved in 4-6 weeks      Patient will demonstrate the ability to discriminate the target voice (coordinated voice production) from the uncoordinated voice by counting, reading, speaking in phrases and rating each voice production correctly (coordinated vs uncoordinated voice) with 80% accuracy       Patient will utilize semi-occluded vocal tract exercises without laryngeal tension to promote healthy vocal function with 80% accuracy, to be achieved in 4-6 weeks     Semi-Occluded Vocal Tract Exercises  To target relaxed laryngeal posture and coordination between phonation and respiration, the following activities were completed using principles of SOVTE  Straw Only (Wide Straw)  With phonation (humming):  Patient was able to achieve coordination of breath with voice 100% accuracy  Straw Only (Wide Straw)  With phonation (pitch glides): Patient was able to achieve coordination of breath with voice 80% accuracy  Patient will utilize a forward tone focused/resonant voice to decrease vocal hyperfunction and improve voice quality and vocal projection, to be achieved in 4-6 weeks  Patient demonstrated a forward focused, coordinated voice with 90% at phoneme level on /m/  Patient reported that he could feel it in his lips  Utilizing a high to low pitch glide - patient demonstrated a forward focused, coordinated voice with 70% on "mum"  Utilizing a high to low pitch glide - patient demonstrated a forward focused, coordinated voice with 80% on "m-ah"      Utilizing a high to low pitch glide - patient demonstrated a forward focused, coordinated voice with 50% on "m-oh"  Utilizing a high to low pitch glide - patient demonstrated a forward focused, coordinated voice with 90% on "m-ay"  Keeping the /m/ resonance and merely opening the lips to produce "ah" significantly improved forward focus at phoneme level for the patient  Intermittently utilized straw phonation to encourage forward focused placement  This was a successful strategy for the patient       Patient will produce easy resonant voice / flow phonation on phonemes, words, phrases and chanting with 80% accuracy, to be achieved in 4-6 weeks  Patient will increase loudness using RV or Flow Phonation with messa di voce in trials with 80% accuracy, to be achieved in 4-6 weeks       Patient will produce coordinated voice production in different situations (e g  telephone, over background noise, emotional topics, speaking at a distance) with 80% accuracy as perceptually judged by clinician  Plan:  -Patient was provided with home exercises/activities to target goals in plan of care at the end of today's session   -Continue with current plan of care

## 2022-04-26 ENCOUNTER — OFFICE VISIT (OUTPATIENT)
Dept: SPEECH THERAPY | Facility: CLINIC | Age: 62
End: 2022-04-26
Payer: COMMERCIAL

## 2022-04-26 DIAGNOSIS — R49.0 DYSPHONIA: Primary | ICD-10-CM

## 2022-04-26 DIAGNOSIS — R49.0 HOARSENESS: ICD-10-CM

## 2022-04-26 DIAGNOSIS — J38.01 PARALYSIS OF LEFT VOCAL CORD: ICD-10-CM

## 2022-04-26 PROCEDURE — 92507 TX SP LANG VOICE COMM INDIV: CPT

## 2022-05-03 ENCOUNTER — EVALUATION (OUTPATIENT)
Dept: SPEECH THERAPY | Facility: CLINIC | Age: 62
End: 2022-05-03
Payer: COMMERCIAL

## 2022-05-03 DIAGNOSIS — R49.0 HOARSENESS: ICD-10-CM

## 2022-05-03 DIAGNOSIS — J38.01 PARALYSIS OF LEFT VOCAL CORD: ICD-10-CM

## 2022-05-03 DIAGNOSIS — R49.0 DYSPHONIA: Primary | ICD-10-CM

## 2022-05-03 PROCEDURE — 92507 TX SP LANG VOICE COMM INDIV: CPT

## 2022-05-03 NOTE — PROGRESS NOTES
Daily Speech Treatment Note    Today's date: 5/3/2022  Patients name: Ezequiel Estevez  : 1960  MRN: [id number]  Safety measures: N/A  Referring provider: Shelly Heimlich, MD     Primary diagnosis/billing code: R 49 0  Secondary diagnosis/billing code: J38 01     Visit tracking:  -Referring provider: Epic  -Billing guidelines: AMA  -Visit #4  -[facility]  [facility] - will re-eval next week (5/10/22)    Subjective/Behavioral:  - Patient reports that his voice is feeling "alright"  Practiced voice exercises once a day - RVT only  - Started on new blood pressure medicine a week and a half ago - this medication is supposed to help with regeneration of nerves  Objective/Assessment:  -See goals targeted below for assessment details  - Patient demonstrated overall improvement in producing a connected, forward focused voice with SOVT  Short-term goals:  Patient will be educated on vocal hygiene (increase H2O, decrease caffeine and alcohol intake) and demonstrate understanding of recommendations to facilitate increased quality of voice, to be achieved in 2-4 weeks        Patient will decrease laryngeal and articulatory muscle tension by independently completing relaxation/stretching exercises, to be achieved in 6-8 weeks  Patient was lead through a series of neck/shoulder exercises to reduce laryngeal tension  Performed with 100% accuracy  Required min reminders to keep shoulders relaxed  Patient will be educated on diaphragmatic breathing (versus clavicular breathing) to establish controlled, rhythmic breathing, to be achieved in 2-4 weeks      Patient will utilize diaphragmatic breathing during oral sentence/paragraph reading in 80% of opportunities to facilitate increased breath support for voice/speaking, decrease laryngeal effort, and improve glottic closure to be achieved in 4-6 weeks      Patient will demonstrate the ability to discriminate the target voice (coordinated voice production) from the uncoordinated voice by counting, reading, speaking in phrases and rating each voice production correctly (coordinated vs uncoordinated voice) with 80% accuracy  Patient accurately differentiated coordinated from uncoordinated voice with 80% accuracy  Patient will utilize semi-occluded vocal tract exercises without laryngeal tension to promote healthy vocal function with 80% accuracy, to be achieved in 4-6 weeks     Semi-Occluded Vocal Tract Exercises  To target relaxed laryngeal posture and coordination between phonation and respiration, the following activities were completed using principles of SOVTE  Cup Bubbling (wide straw and cup with water): Without phonation: 90% accuracy  Demonstrated some breath holding/tension on first trial  Corrected following verbal/tactile cueing  With phonation (humming):  Patient was able to achieve coordination of breath with voice 100% accuracy  Short bursts improved accuracy  With phonation (low pitch glides):  Patient was able to achieve coordination of breath with voice 90% accuracy  The goal was to produce a lower forward focus voice that fell below his falsetto  Straw Only (Wide Straw)  With phonation (humming):  Patient was able to achieve coordination of breath with voice 80% accuracy  Patient will utilize a forward tone focused/resonant voice to decrease vocal hyperfunction and improve voice quality and vocal projection, to be achieved in 4-6 weeks  Patient demonstrated a forward focused, coordinated voice with 90% at phoneme level on /m/  Patient reported that he could feel it in his lips  Utilizing a low to high (pre-falsetto) pitch glide - patient demonstrated a forward focused, coordinated voice with 60% on "mum"  Utilizing a low to high (pre-falsetto) pitch glide - patient demonstrated a forward focused, coordinated voice with 50% on "m-ah      Patient demonstrated a forward focused, coordinated voice with 60% at word level - without chanting - just spoken word  Patient created a more clear voice without chanting  Patient demonstrated a forward focused, coordinated voice with 20% at phrase level - without chanting - just spoken word      Patient will produce easy resonant voice / flow phonation on phonemes, words, phrases and chanting with 80% accuracy, to be achieved in 4-6 weeks  Patient will increase loudness using RV or Flow Phonation with messa di voce in trials with 80% accuracy, to be achieved in 4-6 weeks       Patient will produce coordinated voice production in different situations (e g  telephone, over background noise, emotional topics, speaking at a distance) with 80% accuracy as perceptually judged by clinician  Plan:  -Patient was provided with home exercises/activities to target goals in plan of care at the end of today's session   -Continue with current plan of care

## 2022-05-03 NOTE — PROGRESS NOTES
Speech-Language Pathology Re-Evaluation    Today's date: 5/3/2022  Patients name: Sonia Cuellar  : 1960  MRN: [id number]  Safety measures: N/A  Referring provider: Donita Byrne MD     Primary diagnosis/billing code: R 49 0  Secondary diagnosis/billing code: J38 01     Visit tracking:  -Referring provider: Epic  -Billing guidelines: AMA  -Visit #1 (5 Total)  -[id number] [address] [place] due 6/10/22      Subjective comments: When asked about his voice, "It's not as hoarse as it used to be " He doesn't feel that he runs out of air when speaking as much as he used to  His brother in law made a comment on  that his voice sounded better  He has been practicing RVT words and phrases at home and reports a forward focused voice approximately 75% of the time  He feels that he has had to clear his throat more over this past week  He does experience hay fever and spent time outside mowing the grass yesterday  Patient's goal(s): To get rid of the hoarseness    Assessments      1  Voice Handicap Index (VHI): The VHI is a list of 30 statements that many people have used to describe their voices and the effects of their voices on their lives  Patient indicated how frequently he has the same experience using a rating point scale (never = 0, almost never = 1, sometimes = 2, almost always = 3, and always = 4)  Patient's results were as follows:    Subscale: Score: Self-Perceived Impairment Level:   Physical 13/40 Mild   Functional 10/40 Mild   Emotional 4/40 Mild        TOTAL 27/120 Mild       PERCEPTUAL VOICE ASSESSMENT:    2  Consensus Auditory Perceptual Evaluation of Voice (CAPE-V): The CAPE-V rates auditory-perceptual qualities of a patients voice  The overall severity, roughness, breathiness, strain, pitch and loudness are rated during several tasks including general conversation, vowel prolongation, and reading of sentences   Patient also read the [place] Passage to assess the coordination of respiration and voice production  The ratings of the abovementioned parameters were plotted on a 100-millimeter scale, with 0 corresponding to typical normal voice and 100 indicating a mildly deviant voice  Parameter: Rating: Severity & Perceptual Observations:   *Overall Severity Roughness 37/100 Moderate   Roughness 38/100 Moderate   Breathiness 0/100 WNL   Strain 37/100 Moderate   Pitch 14/100 Mild   Loudness 8/100 Mild   Focus of Resonance Normal WNL       RESPIRATORY EFFICIENCY:   3  S:Z Ratio Task: Patient was instructed to sustain the sounds /s/ and /z/ across three trials to examine the coordination and efficiency of respiration and voice production  Normative data suggests that adults can prolong these sounds for 20-25 seconds  Ratios of 1 4 and above are consistent with laryngeal inefficiency, and ratios of 2 0 and above are suggestive of vocal fold pathology  Task: Trial 1 (sec): Trial 2 (sec): Trial 3 (sec):   /s/ 17 32 13 63 17 54   /z/ 9 67 10 43 9 51     Best /s/: 17 54  Best /z/: 10 43      Ratio: 1 68      4  Maximum Phonation Time: Patient was instructed to sustain the sound /ah/ across three trials to measure respiratory and laryngeal coordination and efficiency  Adults are typically able to prolong these vowels sound for 15-20 seconds  Reduced MPT may suggest poor respiratory support, or poor medial glottal closure  Trial 1 (sec): Trial 2 (sec): Trial 3 (sec):   7 22 6 34 5 54     Average Duration: 6 36 seconds      MEASURES OF PITCH:  The Visi-Pitch IV, a computer-driven voice analysis program, was used to collect objective voice data using the Visi-Pitch Multidimensional Voice Program (MDVP) & Real Time Pitch Program (MTPP)  5  Multi-Dimensional Voice Profile (MDVP):  This is obtained on the phonation of the sound /a/, in which dimensions of the voice, including frequncy perturbations, amplitude perturbations, variations in F0, noise to harmonic ratio, voice turbulence Index, soft phonation Index, tremours in frequency and amplitude, degree of subharmonics, and degree of voicing apart from the frequency and amplitude, are reflected  Normative Data:   Mean Fundamental Frequency (F0) 107 347 Hz 145 22 Hz   Jitter (RAP) 2 380% 0 345%   Shimmer 10 944% 2 523%   Tkbvd-be-Tyqafevvn Ratio (NHR) 0 421 0 122       6  Habitual Pitch: Patient was instructed to engage in two different speaking tasks (counting and reading) to calculate the pitch he uses most frequently  Task: Mean F0 (Hz) Min-Max Range (Hz) Normative Data:   Speaking (counting 1-20) 130 91 Hz 79 5 Hz - 288  47Hz 128 Hz (100 Hz -150 Hz)   Reading ("The [place] Passage) 126 08 Hz 67 96 HZ - 284 53 Hz 128 Hz (100 Hz -150 Hz)       7  Maximal Phonational Frequency Range: Patient was instructed to voice from lowest to highest notes  Task Min-Max Range (Hz) Normative Data:   Gliding 73 62 Hz- 244 61 Hz 77 Hz-576 Hz         Goals    Short-term goals:  Patient will be educated on vocal hygiene (increase H2O, decrease caffeine and alcohol intake) and demonstrate understanding of recommendations to facilitate increased quality of voice, to be achieved in 2-4 weeks  PARTIALLY MET - CONTINUE     Patient will decrease laryngeal and articulatory muscle tension by independently completing relaxation/stretching exercises, to be achieved in 6-8 weeks  PARTIALLY MET - CONTINUE       Patient will be educated on diaphragmatic breathing (versus clavicular breathing) to establish controlled, rhythmic breathing, to be achieved in 2-4 weeks  GOAL MET     Patient will utilize diaphragmatic breathing during oral sentence/paragraph reading in 80% of opportunities to facilitate increased breath support for voice/speaking, decrease laryngeal effort, and improve glottic closure to be achieved in 4-6 weeks   PARTIALLY MET - CONTINUE     Patient will demonstrate the ability to discriminate the target voice (coordinated voice production) from the uncoordinated voice by counting, reading, speaking in phrases and rating each voice production correctly (coordinated vs uncoordinated voice) with 80% accuracy  PARTIALLY MET - CONTINUE     Patient will utilize semi-occluded vocal tract exercises without laryngeal tension to promote healthy vocal function with 80% accuracy, to be achieved in 4-6 weeks  PARTIALLY MET - CONTINUE     Patient will utilize a forward tone focused/resonant voice to decrease vocal hyperfunction and improve voice quality and vocal projection, to be achieved in 4-6 weeks  PARTIALLY MET - CONTINUE       Patient will produce easy resonant voice / flow phonation on phonemes, words, phrases and chanting with 80% accuracy, to be achieved in 4-6 weeks  PARTIALLY MET - CONTINUE     Patient will increase loudness using RV or Flow Phonation with messa di voce in trials with 80% accuracy, to be achieved in 4-6 weeks  PARTIALLY MET - CONTINUE     Patient will produce coordinated voice production in different situations (e g  telephone, over background noise, emotional topics, speaking at a distance) with 80% accuracy as perceptually judged by clinician  PARTIALLY MET - CONTINUE       Long-term goals:     Patient will improve vocal quality for increased functional communication by discharge       To maximize vocal effectiveness 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 and to return to vocal activities of daily living        Impressions/Recommendations    Impressions:   - Patient demonstrates overall improvement in both diagnostic and perceptual measures of voice evaluation  His VHI score has improved from a moderate to a mild level of impact on his every day life  His s/z ratio and MPT score demonstrate marked improvement, indicating improvement in use of his breath support and laryngeal efficiency  He currently presents with a moderately dysphonic voice, characterized by roughness, strain and mild reduced loudness and low pitch  During his initial evaluation, the patient presented with a moderate to severe dysphonia  The patient has demonstrated the ability to produce a forward focused voice during therapeutic exercises and is utilizing these exercises at home on a daily basis  He requires further voice therapy to continue to improve overall voice quality at word to conversation level, for generalization in everyday communication      Recommendations:  -Patient would benefit from outpatient skilled Speech Therapy services: Voice therapy    -Frequency: 1x weekly  -Duration: 6-8 weeks    -Intervention certification from: 7/96/5546  -Intervention certification to: 5/22/4276    -Intervention comments:   45 minute voice evaluation time

## 2022-05-10 ENCOUNTER — EVALUATION (OUTPATIENT)
Dept: SPEECH THERAPY | Facility: CLINIC | Age: 62
End: 2022-05-10
Payer: COMMERCIAL

## 2022-05-10 DIAGNOSIS — R49.0 DYSPHONIA: Primary | ICD-10-CM

## 2022-05-10 DIAGNOSIS — R49.0 HOARSENESS: ICD-10-CM

## 2022-05-10 DIAGNOSIS — J38.01 PARALYSIS OF LEFT VOCAL CORD: ICD-10-CM

## 2022-05-10 PROCEDURE — 92524 BEHAVRAL QUALIT ANALYS VOICE: CPT

## 2022-05-10 NOTE — PROGRESS NOTES
Daily Speech Treatment Note    Today's date: 2022  Patients name: Selmer Cogan  : 1960  MRN: [id number]  Safety measures: N/A  Referring provider: Dion Becerra MD     Primary diagnosis/billing code: R 49 0  Secondary diagnosis/billing code: J38 01     Visit tracking:  -Referring provider: Epic  -Billing guidelines: AMA  -Visit #2 (6 Total)  -[id number] [place] due 6/10/22    Subjective/Behavioral:  - Patient reports that his voice is coming and going  His wife says, "Like an adolescent " "There's times where I'm thinking it almost sounds normal "    Objective/Assessment:  -See goals targeted below for assessment details  - Patient demonstrated marked improvement in ability to speak with a more forward focused voice, connected to breath - resulting in less roughness in the voice  Short-term goals:  Patient will be educated on vocal hygiene (increase H2O, decrease caffeine and alcohol intake) and demonstrate understanding of recommendations to facilitate increased quality of voice, to be achieved in 2-4 weeks  PARTIALLY MET - CONTINUE       Patient will decrease laryngeal and articulatory muscle tension by independently completing relaxation/stretching exercises, to be achieved in 6-8 weeks  PARTIALLY MET - CONTINUE  Patient was lead through a series of neck/shoulder exercises to reduce laryngeal tension  Performed with 100% accuracy  Required min reminders to keep shoulders relaxed  Patient will utilize diaphragmatic breathing during oral sentence/paragraph reading in 80% of opportunities to facilitate increased breath support for voice/speaking, decrease laryngeal effort, and improve glottic closure to be achieved in 4-6 weeks  PARTIALLY MET - CONTINUE  Patient performed SOVT pitch glides in between speaking sentences of increasing length: Patient maintained a coordinated speaking voice throughout exercise with 60% accuracy      Patient will demonstrate the ability to discriminate the target voice (coordinated voice production) from the uncoordinated voice by counting, reading, speaking in phrases and rating each voice production correctly (coordinated vs uncoordinated voice) with 80% accuracy  PARTIALLY MET - CONTINUE      Patient will utilize semi-occluded vocal tract exercises without laryngeal tension to promote healthy vocal function with 80% accuracy, to be achieved in 4-6 weeks  PARTIALLY MET - CONTINUE   Semi-Occluded Vocal Tract Exercises  To target relaxed laryngeal posture and coordination between phonation and respiration, the following activities were completed using principles of SOVTE  Cup Bubbling (wide straw and cup with water): With phonation (humming):  Patient was able to achieve coordination of breath with voice 100% accuracy  He was able to bring his pitch down to a comfortable, more appropriate level, considerably, from previous sessions  With phonation (pitch glides):  Patient was able to achieve coordination of breath with voice 90% accuracy  The goal was to produce a lower forward focus voice that fell below his falsetto  Patient will utilize a forward tone focused/resonant voice to decrease vocal hyperfunction and improve voice quality and vocal projection, to be achieved in 4-6 weeks  PARTIALLY MET - CONTINUE  Patient demonstrated a forward focused, coordinated voice with 50% accuracy at phoneme level on /m/  Patient demonstrated a forward focused, coordinated voice with 80% on "mum"  Patient demonstrated a forward focused, coordinated voice with 90% on "m-ah", "m-oh", "m-ee", and "may"  Patient demonstrated a forward focused, coordinated voice with 60% at word level - without chanting - just spoken word  Patient created a more clear voice without chanting  Voice improved with cueing for added breath support      Patient demonstrated a forward focused, coordinated voice with 50% at phrase level - without chanting - just spoken word      Patient will produce easy resonant voice / flow phonation on phonemes, words, phrases and chanting with 80% accuracy, to be achieved in 4-6 weeks  Patient will increase loudness using RV or Flow Phonation with messa di voce in trials with 80% accuracy, to be achieved in 4-6 weeks       Patient will produce coordinated voice production in different situations (e g  telephone, over background noise, emotional topics, speaking at a distance) with 80% accuracy as perceptually judged by clinician  Plan:  -Patient was provided with home exercises/activities to target goals in plan of care at the end of today's session   -Continue with current plan of care

## 2022-05-17 ENCOUNTER — EVALUATION (OUTPATIENT)
Dept: SPEECH THERAPY | Facility: CLINIC | Age: 62
End: 2022-05-17
Payer: COMMERCIAL

## 2022-05-17 DIAGNOSIS — J38.01 PARALYSIS OF LEFT VOCAL CORD: ICD-10-CM

## 2022-05-17 DIAGNOSIS — R49.0 DYSPHONIA: Primary | ICD-10-CM

## 2022-05-17 DIAGNOSIS — R49.0 HOARSENESS: ICD-10-CM

## 2022-05-17 PROCEDURE — 92507 TX SP LANG VOICE COMM INDIV: CPT

## 2022-05-19 NOTE — PROGRESS NOTES
Daily Speech Treatment Note    Today's date: 2022  Patients name: Vaishali Campbell  : 1960  MRN: [id number]  Safety measures: N/A  Referring provider: Negrita Bryant MD     Primary diagnosis/billing code: R 49 0  Secondary diagnosis/billing code: J38 01     Visit tracking:  -Referring provider: Epic  -Billing guidelines: AMA  -Visit #3 (7 Total)  -[address] [place] due 6/10/22    Subjective/Behavioral:  - Patient reports that his voice comes and goes, "I really don't see much of a change right now " This is in regard to this past week  He will be seeing the ENT (Dr Dash Martinez) on the   Objective/Assessment:  -See goals targeted below for assessment details  Short-term goals:  Patient will be educated on vocal hygiene (increase H2O, decrease caffeine and alcohol intake) and demonstrate understanding of recommendations to facilitate increased quality of voice, to be achieved in 2-4 weeks  PARTIALLY MET - CONTINUE       Patient will decrease laryngeal and articulatory muscle tension by independently completing relaxation/stretching exercises, to be achieved in 6-8 weeks  PARTIALLY MET - CONTINUE  Patient was lead through a series of neck/shoulder exercises to reduce laryngeal tension  Performed with 100% accuracy  Required min reminders to keep shoulders relaxed  Patient will utilize diaphragmatic breathing during oral sentence/paragraph reading in 80% of opportunities to facilitate increased breath support for voice/speaking, decrease laryngeal effort, and improve glottic closure to be achieved in 4-6 weeks  PARTIALLY MET - CONTINUE  Patient performed SOVT pitch glides in between speaking sentences of increasing length: Patient maintained a coordinated speaking voice throughout exercise with 40% accuracy  Patient demonstrated intermittently clear moments of speaks and optimal pitch  Patient reported that his voice sounded a little rough after the exercise  Patient will demonstrate the ability to discriminate the target voice (coordinated voice production) from the uncoordinated voice by counting, reading, speaking in phrases and rating each voice production correctly (coordinated vs uncoordinated voice) with 80% accuracy  PARTIALLY MET - CONTINUE  Patient accurately discriminated the target voice in 90% of trials  Patient will utilize semi-occluded vocal tract exercises without laryngeal tension to promote healthy vocal function with 80% accuracy, to be achieved in 4-6 weeks  PARTIALLY MET - CONTINUE   Semi-Occluded Vocal Tract Exercises  To target relaxed laryngeal posture and coordination between phonation and respiration, the following activities were completed using principles of SOVTE  Cup Bubbling (straw and cup with water): Patient transitioned to the regular sized straw today with good success  With phonation (humming):  Patient was able to achieve coordination of breath with voice 90% accuracy  With phonation (pitch glides):  Patient was able to achieve coordination of breath with voice 100% accuracy  The goal was to produce a lower forward focus voice that fell below his falsetto, which he continues to make gains in  His voice is falling closer to optimal pitch from session to session  Patient will utilize a forward tone focused/resonant voice to decrease vocal hyperfunction and improve voice quality and vocal projection, to be achieved in 4-6 weeks  PARTIALLY MET - CONTINUE    Patient demonstrated a forward focused, coordinated voice with 50% accuracy on "mum"  Patient demonstrated a forward focused, coordinated voice with 90% accuracy on "m-ah", "m-oh", and "m-oo"  Patient demonstrated a forward focused, coordinated voice with 80% at word level - with chanting  Patient utilized straw phonation with downward pitch glide into chanting "moh" or "moo" before chanting the full word   Demonstrated increased accuracy of connected, forward voice with this strategy  Patient will produce easy resonant voice / flow phonation on phonemes, words, phrases and chanting with 80% accuracy, to be achieved in 4-6 weeks  Patient will increase loudness using RV or Flow Phonation with messa di voce in trials with 80% accuracy, to be achieved in 4-6 weeks       Patient will produce coordinated voice production in different situations (e g  telephone, over background noise, emotional topics, speaking at a distance) with 80% accuracy as perceptually judged by clinician  Plan:  -Patient was provided with home exercises/activities to target goals in plan of care at the end of today's session   -Continue with current plan of care  normal...

## 2022-05-24 ENCOUNTER — EVALUATION (OUTPATIENT)
Dept: SPEECH THERAPY | Facility: CLINIC | Age: 62
End: 2022-05-24
Payer: COMMERCIAL

## 2022-05-24 DIAGNOSIS — R49.0 HOARSENESS: Primary | ICD-10-CM

## 2022-05-24 DIAGNOSIS — R49.0 DYSPHONIA: ICD-10-CM

## 2022-05-24 DIAGNOSIS — J38.01 PARALYSIS OF LEFT VOCAL CORD: ICD-10-CM

## 2022-05-24 PROCEDURE — 92507 TX SP LANG VOICE COMM INDIV: CPT

## 2022-05-24 NOTE — PROGRESS NOTES
Daily Speech Treatment Note    Today's date: 2022  Patients name: Louisa Boss  : 1960  MRN: [id number]  Safety measures: N/A  Referring provider: Sadia Rahman MD     Primary diagnosis/billing code: R 49 0  Secondary diagnosis/billing code: J38 01     Visit tracking:  -Referring provider: Epic  -Billing guidelines: AMA  -Visit #4 (8 Total)  -[id number] [address] due 6/10/22    Subjective/Behavioral:  - Patient reports that his voice has been more rough this week  He has been working outside a lot  Dr Arloa Dancer (ENT) 22 follow-up appointment, "On flexible laryngoscopy, there is good mobility of the right TVC  Persistent left TVC paralysis "      Objective/Assessment:  -See goals targeted below for assessment details  - Due to increased roughness, the patient benefited from returning to use of the wide straw during SOVT  - Following the patient's recent ENT visit, determining no further improvement in mobility of the left vocal fold, the patient would like to switch to therapy every two weeks until his visit with Dr Farrah Avila (ENT) on   He would like to continue voice therapy with reduced frequency to not lose any gains made  His next therapy appointment will be on      Short-term goals:  Patient will be educated on vocal hygiene (increase H2O, decrease caffeine and alcohol intake) and demonstrate understanding of recommendations to facilitate increased quality of voice, to be achieved in 2-4 weeks  PARTIALLY MET - CONTINUE  Patient reports good H2O intake  Continues with usual weekly caffeine and alcohol consumption  Suggested reducing caffeine or alcohol intake as it may be contributing to roughness in voice      Patient will decrease laryngeal and articulatory muscle tension by independently completing relaxation/stretching exercises, to be achieved in 6-8 weeks   PARTIALLY MET - CONTINUE  Patient was led through a series of neck/shoulder exercises to reduce laryngeal tension  Performed with 100% accuracy  Required min reminders to keep shoulders relaxed  Patient will utilize diaphragmatic breathing during oral sentence/paragraph reading in 80% of opportunities to facilitate increased breath support for voice/speaking, decrease laryngeal effort, and improve glottic closure to be achieved in 4-6 weeks  PARTIALLY MET - CONTINUE        Patient will demonstrate the ability to discriminate the target voice (coordinated voice production) from the uncoordinated voice by counting, reading, speaking in phrases and rating each voice production correctly (coordinated vs uncoordinated voice) with 80% accuracy  PARTIALLY MET - CONTINUE  Patient accurately discriminated the target voice in 100% of trials  Patient will utilize semi-occluded vocal tract exercises without laryngeal tension to promote healthy vocal function with 80% accuracy, to be achieved in 4-6 weeks  PARTIALLY MET - CONTINUE   Semi-Occluded Vocal Tract Exercises  To target relaxed laryngeal posture and coordination between phonation and respiration, the following activities were completed using principles of SOVTE  Cup Bubbling (straw and cup with water): With phonation (humming):  Patient was able to achieve coordination of breath with voice 100% accuracy  With phonation (pitch glides):  Patient was able to achieve coordination of breath with voice 50% accuracy  Required cueing to produce a more forward focused voice coming down the scale  Patient will utilize a forward tone focused/resonant voice to decrease vocal hyperfunction and improve voice quality and vocal projection, to be achieved in 4-6 weeks  PARTIALLY MET - CONTINUE    Utilized SOVT wide straw with cup pitch glides intermittently throughout RVT exercises  Patient demonstrated a forward focused, coordinated voice with 50% accuracy on "mum"      Patient demonstrated a forward focused, coordinated voice with 30% accuracy on "m-ah", "m-oh" with 80% accuracy, and "m-oo" with 80% accuracy  Patient demonstrated a forward focused, coordinated voice with 60% at word level  Patient utilized straw phonation with pitch glide at the start of each column  Initial words were more coordinated due to utilizing SOVT  Encouraged the patient to practice with SOVT in between ever two words for homework  Patient will produce easy resonant voice / flow phonation on phonemes, words, phrases and chanting with 80% accuracy, to be achieved in 4-6 weeks  Patient will increase loudness using RV or Flow Phonation with messa di voce in trials with 80% accuracy, to be achieved in 4-6 weeks       Patient will produce coordinated voice production in different situations (e g  telephone, over background noise, emotional topics, speaking at a distance) with 80% accuracy as perceptually judged by clinician  Plan:  -Patient was provided with home exercises/activities to target goals in plan of care at the end of today's session   -Continue with current plan of care

## 2022-05-25 ENCOUNTER — APPOINTMENT (OUTPATIENT)
Dept: LAB | Age: 62
End: 2022-05-25
Payer: COMMERCIAL

## 2022-05-25 DIAGNOSIS — I10 ESSENTIAL HYPERTENSION, MALIGNANT: ICD-10-CM

## 2022-05-25 DIAGNOSIS — Z12.5 SPECIAL SCREENING FOR MALIGNANT NEOPLASM OF PROSTATE: ICD-10-CM

## 2022-05-25 DIAGNOSIS — E89.0 POST-OPERATIVE HYPOTHYROIDISM: ICD-10-CM

## 2022-05-25 DIAGNOSIS — Z79.899 ENCOUNTER FOR LONG-TERM (CURRENT) USE OF OTHER MEDICATIONS: ICD-10-CM

## 2022-05-25 DIAGNOSIS — E78.49 OTHER HYPERLIPIDEMIA: ICD-10-CM

## 2022-05-25 DIAGNOSIS — E11.65 TYPE II DIABETES MELLITUS WITH HYPEROSMOLARITY, UNCONTROLLED (HCC): ICD-10-CM

## 2022-05-25 DIAGNOSIS — E11.00 TYPE II DIABETES MELLITUS WITH HYPEROSMOLARITY, UNCONTROLLED (HCC): ICD-10-CM

## 2022-05-25 DIAGNOSIS — E55.9 AVITAMINOSIS D: ICD-10-CM

## 2022-05-25 LAB
25(OH)D3 SERPL-MCNC: 46.5 NG/ML (ref 30–100)
ALBUMIN SERPL BCP-MCNC: 4.1 G/DL (ref 3.5–5)
ALP SERPL-CCNC: 51 U/L (ref 46–116)
ALT SERPL W P-5'-P-CCNC: 53 U/L (ref 12–78)
ANION GAP SERPL CALCULATED.3IONS-SCNC: 5 MMOL/L (ref 4–13)
AST SERPL W P-5'-P-CCNC: 21 U/L (ref 5–45)
BILIRUB SERPL-MCNC: 1.97 MG/DL (ref 0.2–1)
BUN SERPL-MCNC: 14 MG/DL (ref 5–25)
CALCIUM SERPL-MCNC: 9.5 MG/DL (ref 8.3–10.1)
CHLORIDE SERPL-SCNC: 103 MMOL/L (ref 100–108)
CHOLEST SERPL-MCNC: 162 MG/DL
CO2 SERPL-SCNC: 29 MMOL/L (ref 21–32)
CREAT SERPL-MCNC: 0.82 MG/DL (ref 0.6–1.3)
GFR SERPL CREATININE-BSD FRML MDRD: 94 ML/MIN/1.73SQ M
GLUCOSE P FAST SERPL-MCNC: 152 MG/DL (ref 65–99)
HDLC SERPL-MCNC: 47 MG/DL
LDLC SERPL CALC-MCNC: 90 MG/DL (ref 0–100)
NONHDLC SERPL-MCNC: 115 MG/DL
POTASSIUM SERPL-SCNC: 4 MMOL/L (ref 3.5–5.3)
PROT SERPL-MCNC: 7.3 G/DL (ref 6.4–8.2)
PSA SERPL-MCNC: 2.8 NG/ML (ref 0–4)
SODIUM SERPL-SCNC: 137 MMOL/L (ref 136–145)
T4 FREE SERPL-MCNC: 1.13 NG/DL (ref 0.76–1.46)
T4 SERPL-MCNC: 11.4 UG/DL (ref 4.7–13.3)
TRIGL SERPL-MCNC: 124 MG/DL
TSH SERPL DL<=0.05 MIU/L-ACNC: 6.69 UIU/ML (ref 0.45–4.5)

## 2022-05-25 PROCEDURE — 84443 ASSAY THYROID STIM HORMONE: CPT

## 2022-05-25 PROCEDURE — 83036 HEMOGLOBIN GLYCOSYLATED A1C: CPT

## 2022-05-25 PROCEDURE — 82306 VITAMIN D 25 HYDROXY: CPT

## 2022-05-25 PROCEDURE — 80061 LIPID PANEL: CPT

## 2022-05-25 PROCEDURE — 36415 COLL VENOUS BLD VENIPUNCTURE: CPT

## 2022-05-25 PROCEDURE — G0103 PSA SCREENING: HCPCS

## 2022-05-25 PROCEDURE — 80053 COMPREHEN METABOLIC PANEL: CPT

## 2022-05-25 PROCEDURE — 84439 ASSAY OF FREE THYROXINE: CPT

## 2022-05-26 PROBLEM — Z98.890 HISTORY OF TOTAL THYROIDECTOMY: Status: ACTIVE | Noted: 2022-05-26

## 2022-05-26 PROBLEM — Z90.89 HISTORY OF TOTAL THYROIDECTOMY: Status: ACTIVE | Noted: 2022-05-26

## 2022-05-26 PROBLEM — E89.0 HISTORY OF TOTAL THYROIDECTOMY: Status: ACTIVE | Noted: 2022-05-26

## 2022-05-26 LAB
EST. AVERAGE GLUCOSE BLD GHB EST-MCNC: 143 MG/DL
HBA1C MFR BLD: 6.6 %

## 2022-05-31 ENCOUNTER — OFFICE VISIT (OUTPATIENT)
Dept: SPEECH THERAPY | Facility: CLINIC | Age: 62
End: 2022-05-31
Payer: COMMERCIAL

## 2022-05-31 DIAGNOSIS — R49.0 DYSPHONIA: Primary | ICD-10-CM

## 2022-05-31 DIAGNOSIS — J38.01 PARALYSIS OF LEFT VOCAL CORD: ICD-10-CM

## 2022-05-31 DIAGNOSIS — R49.0 HOARSENESS: ICD-10-CM

## 2022-05-31 PROCEDURE — 92507 TX SP LANG VOICE COMM INDIV: CPT

## 2022-06-21 ENCOUNTER — EVALUATION (OUTPATIENT)
Dept: SPEECH THERAPY | Facility: CLINIC | Age: 62
End: 2022-06-21
Payer: COMMERCIAL

## 2022-06-21 DIAGNOSIS — R49.0 DYSPHONIA: Primary | ICD-10-CM

## 2022-06-21 DIAGNOSIS — J38.01 PARALYSIS OF LEFT VOCAL CORD: ICD-10-CM

## 2022-06-21 DIAGNOSIS — R49.0 HOARSENESS: ICD-10-CM

## 2022-06-21 PROCEDURE — 92524 BEHAVRAL QUALIT ANALYS VOICE: CPT

## 2022-06-21 NOTE — PROGRESS NOTES
Speech-Language Pathology Re-Evaluation    Today's date: 2022  Patients name: Albania Hawk  : 1960  MRN: [id number]  Safety measures: N/A  Referring provider: Jenifer Horan MD     Primary diagnosis/billing code: R 49 0  Secondary diagnosis/billing code: J38 01     Visit tracking:  -Referring provider: Epic  -Billing guidelines: AMA  -Visit #5 (9 Total)  -[id number] [address] due 22      Subjective comments: Patient reports that his voice comes and goes  There is no consistency and it is better in the morning  No improvement in his voice overall since his last treatment session  Patient's goal(s): "I would love to have my voice back "    Assessments    VOICE EVALUATION:      1  Voice Handicap Index (VHI): The VHI is a list of 30 statements that many people have used to describe their voices and the effects of their voices on their lives  Patient indicated how frequently he has the same experience using a rating point scale (never = 0, almost never = 1, sometimes = 2, almost always = 3, and always = 4)  Patient's results were as follows:    Subscale: Score: Self-Perceived Impairment Level:   Physical 12/40 Mild   Functional 8/40 Mild   Emotional 3/40 Mild        TOTAL 23/120 Mild       PERCEPTUAL VOICE ASSESSMENT:    2  Consensus Auditory Perceptual Evaluation of Voice (CAPE-V): The CAPE-V rates auditory-perceptual qualities of a patients voice  The overall severity, roughness, breathiness, strain, pitch and loudness are rated during several tasks including general conversation, vowel prolongation, and reading of sentences  Patient also read the [place] to assess the coordination of respiration and voice production  The ratings of the abovementioned parameters were plotted on a 100-millimeter scale, with 0 corresponding to typical normal voice and 100 indicating a severely deviant voice      Parameter: Rating: Severity & Perceptual Observations:   *Overall Severity Roughness 31/100 Moderate   Roughness 37/100 Moderate   Breathiness 7/100 Mild   Strain 37/100 Moderate   Pitch 10/100 Mild   Loudness 0/100 WNL   Focus of Resonance Normal WNL       RESPIRATORY EFFICIENCY:   3  S:Z Ratio Task: Patient was instructed to sustain the sounds /s/ and /z/ across three trials to examine the coordination and efficiency of respiration and voice production  Normative data suggests that adults can prolong these sounds for 20-25 seconds  Ratios of 1 4 and above are consistent with laryngeal inefficiency, and ratios of 2 0 and above are suggestive of vocal fold pathology  Task: Trial 1 (sec): Trial 2 (sec): Trial 3 (sec):   /s/ 17 51 18 12 16 8   /z/ 15 73 18 07 17 79     Best /s/: 18 12  Best /z/: 18 07     Ratio: 1      4  Maximum Phonation Time: Patient was instructed to sustain the sound /ah/ across three trials to measure respiratory and laryngeal coordination and efficiency  Adults are typically able to prolong these vowels sound for 15-20 seconds  Reduced MPT may suggest poor respiratory support, or poor medial glottal closure  Trial 1 (sec): Trial 2 (sec): Trial 3 (sec):   13 81 20 41 15 95     Average Duration: 16 72 seconds      MEASURES OF PITCH:  The Visi-Pitch IV, a computer-driven voice analysis program, was used to collect objective voice data using the Visi-Pitch Multidimensional Voice Program (MDVP) & Real Time Pitch Program (MTPP)  5  Multi-Dimensional Voice Profile (MDVP): This is obtained on the phonation of the sound /a/, in which dimensions of the voice, including frequncy perturbations, amplitude perturbations, variations in F0, noise to harmonic ratio, voice turbulence Index, soft phonation Index, tremours in frequency and amplitude, degree of subharmonics, and degree of voicing apart from the frequency and amplitude, are reflected        Normative Data:   Mean Fundamental Frequency (F0) 192 562 Hz 145 22 Hz   Jitter (RAP) 4 54% 0 345%   Shimmer 31 19% 2 523%   Fzcvc-as-Oetjkwomj Ratio (NHR) 0 642 0 122       6  Habitual Pitch: Patient was instructed to engage in two different speaking tasks (counting and reading) to calculate the pitch he uses most frequently  Task: Mean F0 (Hz) Min-Max Range (Hz) Normative Data:   Speaking (counting 1-10) 145 36 Hz 87 18 Hz - 222 59 Hz 128 Hz (100 Hz -150 Hz)   Reading ("The [place]) 136 92 Hz 86 69 Hz -267 33 Hz 128 Hz (100 Hz -150 Hz)       7  Maximal Phonational Frequency Range: Patient was instructed to voice from lowest to highest notes  Task Min-Max Range (Hz) Normative Data:   Gliding 87 27 Hz -322 29 Hz 77 Hz-576 Hz             Goals    Short-term goals:  Patient will be educated on vocal hygiene (increase H2O, decrease caffeine and alcohol intake) and demonstrate understanding of recommendations to facilitate increased quality of voice, to be achieved in 2-4 weeks  DISCONTINUE GOAL - As patient does not intend to decrease caffeine or alcohol consumption       Patient will decrease laryngeal and articulatory muscle tension by independently completing relaxation/stretching exercises, to be achieved in 6-8 weeks  PARTIALLY MET - CONTINUE        Patient will be educated on diaphragmatic breathing (versus clavicular breathing) to establish controlled, rhythmic breathing, to be achieved in 2-4 weeks  GOAL MET     Patient will utilize diaphragmatic breathing during oral sentence/paragraph reading in 80% of opportunities to facilitate increased breath support for voice/speaking, decrease laryngeal effort, and improve glottic closure to be achieved in 4-6 weeks  PARTIALLY MET - CONTINUE     Patient will demonstrate the ability to discriminate the target voice (coordinated voice production) from the uncoordinated voice by counting, reading, speaking in phrases and rating each voice production correctly (coordinated vs uncoordinated voice) with 80% accuracy   PARTIALLY MET - CONTINUE     Patient will utilize semi-occluded vocal tract exercises without laryngeal tension to promote healthy vocal function with 80% accuracy, to be achieved in 4-6 weeks  PARTIALLY MET - CONTINUE     Patient will utilize a forward tone focused/resonant voice to decrease vocal hyperfunction and improve voice quality and vocal projection, to be achieved in 4-6 weeks  PARTIALLY MET - CONTINUE        Patient will produce easy resonant voice / flow phonation on phonemes, words, phrases and chanting with 80% accuracy, to be achieved in 4-6 weeks  PARTIALLY MET - CONTINUE     Patient will increase loudness using RV or Flow Phonation with messa di voce in trials with 80% accuracy, to be achieved in 4-6 weeks  PARTIALLY MET - CONTINUE     Patient will produce coordinated voice production in different situations (e g  telephone, over background noise, emotional topics, speaking at a distance) with 80% accuracy as perceptually judged by clinician  PARTIALLY MET - CONTINUE        Long-term goals:     Patient will improve vocal quality for increased functional communication by discharge  CONTINUE     To maximize vocal effectiveness 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 and to return to vocal activities of daily living  CONTINUE    Impressions/Recommendations    Impressions:   Patient continues to demonstrate a moderately dysphonic voice, characterized by moderate roughness and strain, as well as mild breathiness and reduced pitch  He demonstrated some improvement diagnostically as evidenced through patient self report and voice data collected using both the visipitch and a timer (breathing)  On the Vocal Handicap Index (VHI) the patient rated the impact of his dysphonia on daily life as mild in all domains and overall, consistent with his previous re-evaluation on 5/10/22  However, there was a mild improvement in his scoring  Patient has improved maximum phonation time and s/z ratio to within normal limits  Diagnostic statistics for the patient's voice, also demonstrated improvement, with some measures now falling within normal limits  However, it should be noted that jitter and shimmer demonstrated an increase, noting decline in this area  The patient demonstrated moments of clarity and forward focused voice, particularly on sustained "Ah" during maximum phonation time recording  He is demonstrating increased instances of finding his optimal pitch for his age/sex  In these moments his voice is more clear and forward focused  He continues to demonstrate muscle tension, evidenced by perceptual parameters of roughness and strain  This is most likely due to 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  Although, the patient demonstrates some mild gains, overall his voice remains inconsistent  He has received benefit from voice therapy but at this point, true progress will only be gained through nerve regeneration or surgery to augment the vocal fold  This has been discussed with the patient and he is in agreement  The patient will be placed on a 30 day hold, with a planned follow-up phone call after is July 5th ENT evaluation with Dr Jacqueline Oropeza  **UPDATE 7/29/22** - Addendum - Clinician spoke to patient 7/15/22,  following his evaluation with Dr Jacqueline Oropeza  He would like to discontinue therapy at this time as there is not change in status with his laryngeal abnormalities  He is considering surgical options but is unclear whether he will pursue them  As functional improvement of voice is not expected in his present condition, the patient has agreed to discharge from speech therapy services  Recommendations:  -Patient would benefit from outpatient skilled Speech Therapy services: Voice therapy will be placed on hold at this time      -Frequency: To be determined  -Duration: Patient will be placed on a 30 day hold      -Intervention certification from: 8/82/6094  -Intervention certification to: 6/34/5763    -Intervention comments:   50 minutes of voice evaluation time

## 2022-06-28 ENCOUNTER — APPOINTMENT (OUTPATIENT)
Dept: SPEECH THERAPY | Facility: CLINIC | Age: 62
End: 2022-06-28
Payer: COMMERCIAL

## 2022-07-01 ENCOUNTER — APPOINTMENT (OUTPATIENT)
Dept: LAB | Age: 62
End: 2022-07-01
Payer: COMMERCIAL

## 2022-07-01 DIAGNOSIS — E89.0 HISTORY OF TOTAL THYROIDECTOMY: ICD-10-CM

## 2022-07-01 DIAGNOSIS — E89.0 POST-OPERATIVE HYPOTHYROIDISM: ICD-10-CM

## 2022-07-01 LAB — TSH SERPL DL<=0.05 MIU/L-ACNC: 1.22 UIU/ML (ref 0.45–4.5)

## 2022-07-01 PROCEDURE — 36415 COLL VENOUS BLD VENIPUNCTURE: CPT

## 2022-07-01 PROCEDURE — 84443 ASSAY THYROID STIM HORMONE: CPT

## 2022-07-15 ENCOUNTER — TELEPHONE (OUTPATIENT)
Dept: SPEECH THERAPY | Facility: CLINIC | Age: 62
End: 2022-07-15

## 2022-07-15 NOTE — TELEPHONE ENCOUNTER
Called the patient to discuss his recent visit with Dr Shirley Rivas (ENT) to determine further plan of care options, as the patient had placed his voice therapy on hold  Due to extent of anatomical disorder within the larynx, it was determined by the clinician and patient and no further voice therapy should be pursued at this time  Patient will be discharged from speech therapy

## 2022-10-04 PROBLEM — R49.0 DYSPHONIA: Status: ACTIVE | Noted: 2022-10-04

## 2022-10-04 PROBLEM — J04.0 REFLUX LARYNGITIS: Status: ACTIVE | Noted: 2022-10-04

## 2022-10-04 PROBLEM — R49.0 MUSCLE TENSION DYSPHONIA: Status: ACTIVE | Noted: 2022-10-04

## 2022-10-04 PROBLEM — K21.9 GASTROESOPHAGEAL REFLUX DISEASE: Status: ACTIVE | Noted: 2022-10-04

## 2022-10-04 PROBLEM — J38.01 PARALYSIS OF LEFT VOCAL FOLD: Status: ACTIVE | Noted: 2022-10-04

## 2022-10-04 PROBLEM — E04.2 MULTINODULAR THYROID: Status: RESOLVED | Noted: 2021-11-04 | Resolved: 2022-10-04

## 2022-10-04 PROBLEM — J38.3 GLOTTIC INSUFFICIENCY: Status: ACTIVE | Noted: 2022-10-04

## 2022-10-04 PROBLEM — J30.2 SEASONAL ALLERGIES: Status: ACTIVE | Noted: 2022-10-04

## 2022-10-04 PROBLEM — K21.9 REFLUX LARYNGITIS: Status: ACTIVE | Noted: 2022-10-04

## 2022-10-12 PROBLEM — H61.23 BILATERAL IMPACTED CERUMEN: Status: RESOLVED | Noted: 2021-11-04 | Resolved: 2022-10-12

## 2022-11-04 ENCOUNTER — TELEPHONE (OUTPATIENT)
Dept: ENDOCRINOLOGY | Facility: CLINIC | Age: 62
End: 2022-11-04

## 2022-11-04 ENCOUNTER — APPOINTMENT (OUTPATIENT)
Dept: LAB | Age: 62
End: 2022-11-04

## 2022-11-04 ENCOUNTER — CONSULT (OUTPATIENT)
Dept: ENDOCRINOLOGY | Facility: CLINIC | Age: 62
End: 2022-11-04

## 2022-11-04 VITALS
DIASTOLIC BLOOD PRESSURE: 90 MMHG | BODY MASS INDEX: 33.11 KG/M2 | SYSTOLIC BLOOD PRESSURE: 152 MMHG | WEIGHT: 272 LBS | HEART RATE: 71 BPM

## 2022-11-04 DIAGNOSIS — E89.0 POST-OPERATIVE HYPOTHYROIDISM: ICD-10-CM

## 2022-11-04 DIAGNOSIS — E89.0 HISTORY OF TOTAL THYROIDECTOMY: ICD-10-CM

## 2022-11-04 DIAGNOSIS — E11.9 TYPE 2 DIABETES MELLITUS WITHOUT COMPLICATION, UNSPECIFIED WHETHER LONG TERM INSULIN USE (HCC): Primary | ICD-10-CM

## 2022-11-04 DIAGNOSIS — E89.0 HISTORY OF TOTAL THYROIDECTOMY: Primary | ICD-10-CM

## 2022-11-04 LAB
T4 FREE SERPL-MCNC: 1.22 NG/DL (ref 0.76–1.46)
TSH SERPL DL<=0.05 MIU/L-ACNC: 5.68 UIU/ML (ref 0.45–4.5)

## 2022-11-04 NOTE — TELEPHONE ENCOUNTER
----- Message from Chanel Astorga MD sent at 11/4/2022  1:47 PM EDT -----  Please call the patient regarding his thyroid blood work results, TSH is slightly elevated and free T4 is normal   Please inform patient to take medication regularly without missing doses 1 hour before breakfast   He should do thyroid blood work repeat  TSH and free T4 in 6 weeks

## 2022-11-04 NOTE — PROGRESS NOTES
Lelo Galan 57 y o  male MRN: [id number]    Encounter: [id number]      Assessment/Plan     Assessment: This is a 57y o -year-old male with hypothyroidism  Plan:    Diagnoses and all orders for this visit:    Type 2 diabetes mellitus without complication, unspecified whether long term insulin use ([facility] )  Lab Results   Component Value Date    HGBA1C 6 6 (H) 05/25/2022   A1c is 6 6%  At goal   Currently he  is managed on metformin and follows with PCP  Continue lifestyle modifications, follow with PCP      History of total thyroidectomy  Pathology was benign goiter  -     Ambulatory Referral to Endocrinology  -     T4, free; Future  -     TSH, 3rd generation; Future    Post-operative hypothyroidism  Obtain TSH, free T4 now, will need adjustment in levothyroxine dose as necessary  If thyroid blood work is within normal range will follow-up in 6 months  Blood work ordered  Educated patient to take levothyroxine on empty stomach 1 hour before breakfast  -     Ambulatory Referral to Endocrinology  -     T4, free; Future  -     TSH, 3rd generation; Future  -     T4, free; Future  -     TSH, 3rd generation; Future        CC:   Multinodular goiter, vitamin-D deficiency    History of Present Illness     HPI:    Lelo Galan is 68-year-old gentleman with medical history of type 2 diabetes, obesity, multinodular goiter is here for evaluation of postoperative hypothyroidism  Patient underwent total thyroidectomy, in December 2021, pathology report was benign goiter  Postoperative calcium was within normal range  Patient is currently not taking any calcium supplementation  For vitamin D deficiency he takes vitamin D, 2000 IU daily   His vitamin-D is within normal range  For hypothyroidism, he takes levothyroxine 200 mcg daily, on empty stomach 1 hour before breakfast   His last TSH was within normal range  Free T4 was not done  He denies palpitation, weight loss, anxiety or nervousness    He denies taking over-the-counter Biotene supplementation      Thyroid USG     THYROID ULTRASOUND     INDICATION:    E04 9: Nontoxic goiter, unspecified  Thyromegaly with posterior superior mediastinal extension of enlarged left thyroid        COMPARISON:  CT chest 10/12/2021     TECHNIQUE:   Ultrasound of the thyroid was performed with a high frequency linear transducer in transverse and sagittal planes including volumetric imaging sweeps as well as traditional still imaging technique      FINDINGS:  Enlarged heterogeneous thyroid gland      Right lobe:  8 3 x 4 8 x 4 7 cm  Left lobe:  10 9 x 4 5 x 2 3 cm  (Likely underestimated as inferior aspect is not completely imaged due to mediastinal extension)  Isthmus:  0 2 cm      Nodule #1  Image 5/82  RIGHT upper pole nodule measuring 1 3 x 1 0 x 0 8 cm  COMPOSITION:  2 points, solid or almost completely solid   ECHOGENICITY:  2 points, hypoechoic  SHAPE:  0 points, wider-than-tall  MARGIN: 0 points, smooth  ECHOGENIC FOCI:  0 points, none or large comet-tail artifacts  TI-RADS Classification: TR 4 (4-6 points), FNA if > 1 5 cm  Follow if > 1cm      Nodule #2  Image 10/82  RIGHT upper pole nodule measuring 1 6 x 1 2 x 1 3 cm  COMPOSITION:  0 points, spongiform  ECHOGENICITY:  Not applicable when spongiform composition  SHAPE:  0 points, wider-than-tall  MARGIN: 0 points, smooth  ECHOGENIC FOCI:  0 points, none or large comet-tail artifacts  TI-RADS Classification: TR 1 (0 points), Benign  No FNA      Nodule #3 (composite)  Image 20/82  RIGHT midgland nodule measuring 6 5 x 4 4 x 4 8 cm  COMPOSITION:  2 points, solid or almost completely solid   ECHOGENICITY:  1 point, hyperechoic or isoechoic  SHAPE:  0 points, wider-than-tall  MARGIN:    ECHOGENIC FOCI:  0 points, none or large comet-tail artifacts  TI-RADS Classification: TR 3 (3 points), FNA if >2 5 cm    Follow if >1 5 cm      Based on the CT scan of 10/12/2021, there is a large nodule arising exophytically from the lower pole left thyroid lobe extending into the posterior retroesophageal aspect of the superior mediastinum resulting in anterior displacement of the esophagus  This is not well visualized on the current ultrasound examination but on the prior CT examination it measures approximately 3 7 x 6 4 x 7 8 cm  Surgical consultation is advised      There are additional nodules of lesser size and/or TI-RADS score  These do not necessitate additional evaluation based on ACR criteria       IMPRESSION:        1  Multinodular goiter as described above  Large exophytic nodule arising from the lower pole left thyroid lobe extends into the posterior superior mediastinum with anterior displacement upon and considerable mass effect upon the esophagus  Surgical   consultation is advised  2   Dominant conglomerate right sided thyroid nodule meets criteria for percutaneous biopsy as described above (nodule 3; 6 5 cm) in the right mid gland        Lab Results   Component Value Date    HGBA1C 6 6 (H) 05/25/2022     BP Readings from Last 3 Encounters:   11/04/22 152/90   12/18/21 142/67   11/16/21 150/90      Latest Reference Range & Units 05/25/22 06:35 07/01/22 09:31   TSH 3RD GENERATON 0 450 - 4 500 uIU/mL 6 690 (H) 1 220   Free T4 0 76 - 1 46 ng/dL 1 13    T4 TOTAL 4 7 - 13 3 ug/dL 11 4    (H): Data is abnormally high   Latest Reference Range & Units 05/25/22 06:35 07/01/22 09:31   TSH 3RD GENERATON 0 450 - 4 500 uIU/mL 6 690 (H) 1 220   Free T4 0 76 - 1 46 ng/dL 1 13    T4 TOTAL 4 7 - 13 3 ug/dL 11 4      Review of Systems    Historical Information   Past Medical History:   Diagnosis Date   • Diabetes mellitus ([address] )    • Hyperlipidemia    • Hypertension      Past Surgical History:   Procedure Laterality Date   • COLONOSCOPY     • PA THYROIDECTOMY N/A 12/17/2021    Procedure: TOTAL THYROIDECTOMY (COMPLICATED), REMOVAL SUBSTERNAL GOITER WITH NERVE MONITORING;  Surgeon: Herlinda Rutherford MD;  Location: [facility];  Service: ENT     Social History   Social History     Substance and Sexual Activity   Alcohol Use Yes   • Alcohol/week: 12 0 standard drinks   • Types: 12 Cans of beer per week     Social History     Substance and Sexual Activity   Drug Use Not on file     Social History     Tobacco Use   Smoking Status Former Smoker   • Packs/day: 1 00   • Years:    • Pack years:    • Types: Cigarettes   • Quit date:    • Years since quittin 8   Smokeless Tobacco Never Used   Tobacco Comment    occ smoked pipes     Family History:   Family History   Problem Relation Age of Onset   • Diabetes Mother    • Hypertension Mother        Meds/Allergies   Current Outpatient Medications   Medication Sig Dispense Refill   • amLODIPine-atorvastatin (CADUET) 10-10 MG per tablet Take 1 tablet by mouth daily in the early morning       • aspirin (ECOTRIN LOW STRENGTH) 81 mg EC tablet Take 1 tablet (81 mg total) by mouth daily  0   • atenolol (TENORMIN) 100 mg tablet Take 100 mg by mouth daily in the early morning       • Cholecalciferol (D3) 50 MCG ( UT) TABS      • levothyroxine (Synthroid) 200 mcg tablet Take 1 tablet (200 mcg total) by mouth daily 90 tablet 3   • lisinopril-hydrochlorothiazide (PRINZIDE,ZESTORETIC) 20-25 MG per tablet Take 1 tablet by mouth daily in the early morning       • metFORMIN (GLUCOPHAGE) 1000 MG tablet Take 1,000 mg by mouth 2 (two) times a day with meals       • Toviaz 4 MG TB24 Take 1 tablet by mouth daily in the early morning       • acetaminophen (TYLENOL) 325 mg tablet 2, by mouth, every 6 hours as needed for mild to moderate pain   30 tablet 0   • calcium carbonate-vitamin D (OSCAL-D) 500 mg-200 units per tablet Take 2 tablets by mouth 3 (three) times a day 180 tablet 0   • diphenhydrAMINE HCl (BENADRYL ALLERGY PO) Take 2 tablets by mouth as needed     • ibuprofen (MOTRIN) 200 mg tablet Take 2 tablets (400 mg total) by mouth every 6 (six) hours as needed for moderate pain 120 tablet 0   • niMODipine (NIMOTOP) 30 mg capsule 30 mg 3 times per day for 7 days then increase to 60 (2 cap) 3 times per day for 12 weeks 561 capsule 0     No current facility-administered medications for this visit  No Known Allergies    Objective   Vitals: Blood pressure 152/90, pulse 71, weight 123 kg (272 lb)  Physical Exam    The history was obtained from the review of the chart, patient  Lab Results:   Lab Results   Component Value Date/Time    TSH 3RD GENERATON 1 220 07/01/2022 09:31 AM    TSH 3RD GENERATON 6 690 (H) 05/25/2022 06:35 AM    TSH 3RD GENERATON 4 600 (H) 03/13/2022 10:11 AM    Free T4 1 13 05/25/2022 06:35 AM    Free T4 1 05 03/13/2022 10:11 AM    Free T4 1 00 01/28/2022 08:35 AM       Imaging Studies:   Results for orders placed during the hospital encounter of 10/20/21    US thyroid    Impression  1  Multinodular goiter as described above  Large exophytic nodule arising from the lower pole left thyroid lobe extends into the posterior superior mediastinum with anterior displacement upon and considerable mass effect upon the esophagus  Surgical  consultation is advised  2   Dominant conglomerate right sided thyroid nodule meets criteria for percutaneous biopsy as described above (nodule 3; 6 5 cm) in the right mid gland  The study was marked in EPIC for significant notification  Reference: ACR Thyroid Imaging, Reporting and Data System (TI-RADS): White Paper of the [employer]  J AM Daisy Radiol 9376;95:739-132  (additional recommendations based on American Thyroid Association 2015 guidelines )      Workstation performed: [id number]      I have personally reviewed pertinent reports  Portions of the record may have been created with voice recognition software  Occasional wrong word or "sound a like" substitutions may have occurred due to the inherent limitations of voice recognition software   Read the chart carefully and recognize, using context, where substitutions have occurred

## 2022-12-16 ENCOUNTER — TELEPHONE (OUTPATIENT)
Dept: ENDOCRINOLOGY | Facility: CLINIC | Age: 62
End: 2022-12-16

## 2022-12-16 ENCOUNTER — LAB (OUTPATIENT)
Dept: LAB | Age: 62
End: 2022-12-16

## 2022-12-16 DIAGNOSIS — E89.0 HISTORY OF TOTAL THYROIDECTOMY: ICD-10-CM

## 2022-12-16 DIAGNOSIS — E89.0 POST-OPERATIVE HYPOTHYROIDISM: ICD-10-CM

## 2022-12-16 LAB
T4 FREE SERPL-MCNC: 1.36 NG/DL (ref 0.76–1.46)
TSH SERPL DL<=0.05 MIU/L-ACNC: 4.72 UIU/ML (ref 0.45–4.5)

## 2022-12-16 NOTE — TELEPHONE ENCOUNTER
----- Message from Disha Roman MD sent at 12/16/2022  1:37 PM EST -----  Please inform patient that thyroid blood work is normal, continue current dose of levothyroxine

## 2023-01-21 ENCOUNTER — PATIENT MESSAGE (OUTPATIENT)
Dept: ENDOCRINOLOGY | Facility: CLINIC | Age: 63
End: 2023-01-21

## 2023-01-21 DIAGNOSIS — E89.0 HISTORY OF TOTAL THYROIDECTOMY: ICD-10-CM

## 2023-01-21 DIAGNOSIS — J38.01 PARALYSIS OF LEFT VOCAL CORD: ICD-10-CM

## 2023-01-23 RX ORDER — LEVOTHYROXINE SODIUM 0.2 MG/1
200 TABLET ORAL DAILY
Qty: 90 TABLET | Refills: 1 | Status: SHIPPED | OUTPATIENT
Start: 2023-01-23

## 2023-02-10 ENCOUNTER — APPOINTMENT (OUTPATIENT)
Dept: LAB | Age: 63
End: 2023-02-10

## 2023-02-10 DIAGNOSIS — E11.00 TYPE II DIABETES MELLITUS WITH HYPEROSMOLARITY, UNCONTROLLED (HCC): ICD-10-CM

## 2023-02-10 DIAGNOSIS — E11.65 TYPE II DIABETES MELLITUS WITH HYPEROSMOLARITY, UNCONTROLLED (HCC): ICD-10-CM

## 2023-02-10 DIAGNOSIS — I10 ESSENTIAL HYPERTENSION, MALIGNANT: ICD-10-CM

## 2023-02-10 DIAGNOSIS — Z79.899 ENCOUNTER FOR LONG-TERM (CURRENT) USE OF OTHER MEDICATIONS: ICD-10-CM

## 2023-02-10 LAB
ANION GAP SERPL CALCULATED.3IONS-SCNC: 6 MMOL/L (ref 4–13)
BUN SERPL-MCNC: 16 MG/DL (ref 5–25)
CALCIUM SERPL-MCNC: 9.1 MG/DL (ref 8.3–10.1)
CHLORIDE SERPL-SCNC: 106 MMOL/L (ref 96–108)
CO2 SERPL-SCNC: 24 MMOL/L (ref 21–32)
CREAT SERPL-MCNC: 0.69 MG/DL (ref 0.6–1.3)
GFR SERPL CREATININE-BSD FRML MDRD: 100 ML/MIN/1.73SQ M
GLUCOSE P FAST SERPL-MCNC: 176 MG/DL (ref 65–99)
POTASSIUM SERPL-SCNC: 3.8 MMOL/L (ref 3.5–5.3)
SODIUM SERPL-SCNC: 136 MMOL/L (ref 135–147)

## 2023-02-11 LAB
EST. AVERAGE GLUCOSE BLD GHB EST-MCNC: 183 MG/DL
HBA1C MFR BLD: 8 %

## 2023-05-08 ENCOUNTER — LAB (OUTPATIENT)
Dept: LAB | Age: 63
End: 2023-05-08

## 2023-05-08 DIAGNOSIS — Z12.5 SPECIAL SCREENING FOR MALIGNANT NEOPLASM OF PROSTATE: ICD-10-CM

## 2023-05-08 DIAGNOSIS — E89.0 POST-OPERATIVE HYPOTHYROIDISM: ICD-10-CM

## 2023-05-08 LAB
PSA SERPL-MCNC: 3 NG/ML (ref 0–4)
T4 FREE SERPL-MCNC: 1.35 NG/DL (ref 0.76–1.46)
TSH SERPL DL<=0.05 MIU/L-ACNC: 0.2 UIU/ML (ref 0.45–4.5)

## 2023-05-10 NOTE — PROGRESS NOTES
Patient Progress Note    CC: hypothyroidism     Referring Provider  No referring provider defined for this encounter  History of Present Illness:     Patient is a 58-year-old male here for follow-up of postsurgical hypothyroidism  He has a history of multinodular goiter and pathology was benign  Patient is on levothyroxine 200 mcg 1 tablet daily  Patient is taking it 1 hr before breakfast on an empty stomach and at least 4 hrs apart from supplements  Tolerating medication well  No history of external radiation to head/neck/chest   No family history of thyroid cancer  No recent Iodine loading in form of medication, biotin or kelp supplements or radiological diagnostic studies  TSH recently low at 0 199 and free T4 normal at 1 35  He does have a history of diabetes which is he prefers is managed by PCP  Last A1c was 8%  He is on metformin  Since his A1c was previously elevated he has made lifestyle modifications and intentionally lost 15 pounds      Patient Active Problem List   Diagnosis   • HTN (hypertension)   • Hyperlipidemia   • Diabetes mellitus, type 2 (HCC)   • Class 1 obesity in adult   • Post-operative hypothyroidism   • History of total thyroidectomy   • Dysphonia   • Paralysis of left vocal fold   • Glottic insufficiency   • Muscle tension dysphonia   • Reflux laryngitis   • Seasonal allergies   • Gastroesophageal reflux disease     Past Medical History:   Diagnosis Date   • Hyperlipidemia    • Hypertension    • Type 2 diabetes mellitus ([facility] )       Past Surgical History:   Procedure Laterality Date   • COLONOSCOPY     • HI THYROIDECTOMY TOTAL/COMPLETE N/A 12/17/2021    Procedure: TOTAL THYROIDECTOMY (COMPLICATED), REMOVAL SUBSTERNAL GOITER WITH NERVE MONITORING;  Surgeon: Kelin Romero MD;  Location: [facility];  Service: ENT      Family History   Problem Relation Age of Onset   • Diabetes Mother    • Hypertension Mother      Social History     Tobacco Use   • Smoking status: Former     Packs/day: " 00     Years: 20 00     Pack years: 20 00     Types: Cigarettes     Quit date: 3/27/2001     Years since quittin 1   • Smokeless tobacco: Never   • Tobacco comments:     occ smoked pipes   Substance Use Topics   • Alcohol use: Yes     Alcohol/week: 12 0 standard drinks     Types: 12 Cans of beer per week     No Known Allergies  Current Outpatient Medications   Medication Sig Dispense Refill   • amLODIPine-atorvastatin (CADUET) 10-10 MG per tablet Take 1 tablet by mouth daily in the early morning       • aspirin (ECOTRIN LOW STRENGTH) 81 mg EC tablet Take 1 tablet (81 mg total) by mouth daily  0   • atenolol (TENORMIN) 100 mg tablet Take 100 mg by mouth daily in the early morning       • Cholecalciferol (D3) 50 MCG (2000 UT) TABS Take 2,000 Units by mouth daily     • levothyroxine (Synthroid) 200 mcg tablet Take 1 tablet daily Monday through Saturday and 1/2 tablet on  90 tablet 1   • lisinopril-hydrochlorothiazide (PRINZIDE,ZESTORETIC) 20-25 MG per tablet Take 1 tablet by mouth daily in the early morning       • metFORMIN (GLUCOPHAGE) 1000 MG tablet Take 1,000 mg by mouth 2 (two) times a day with meals       • Myrbetriq 50 MG TB24 Take 50 mg by mouth daily       No current facility-administered medications for this visit  Review of Systems   Constitutional: Negative for activity change, appetite change, fatigue and unexpected weight change  HENT: Negative for trouble swallowing  Eyes: Negative for visual disturbance  Respiratory: Negative for shortness of breath  Cardiovascular: Negative for chest pain and palpitations  Gastrointestinal: Negative for constipation and diarrhea  Endocrine: Negative for cold intolerance and heat intolerance  Musculoskeletal: Negative  Skin: Negative for wound  Neurological: Negative for tremors  Psychiatric/Behavioral: Negative  Physical Exam:  Body mass index is 31 48 kg/m²    /90   Pulse 57   Ht 6' 4\" (1 93 m)   Wt 117 kg " (258 lb 9 6 oz)   BMI 31 48 kg/m²    Wt Readings from Last 3 Encounters:   05/11/23 117 kg (258 lb 9 6 oz)   11/04/22 123 kg (272 lb)   05/26/22 118 kg (260 lb)       Physical Exam  Vitals and nursing note reviewed  Constitutional:       Appearance: He is well-developed  HENT:      Head: Normocephalic  Eyes:      General: No scleral icterus  Pupils: Pupils are equal, round, and reactive to light  Neck:      Thyroid: No thyromegaly  Cardiovascular:      Rate and Rhythm: Normal rate and regular rhythm  Pulses:           Radial pulses are 2+ on the right side and 2+ on the left side  Heart sounds: No murmur heard  Pulmonary:      Effort: Pulmonary effort is normal  No respiratory distress  Breath sounds: Normal breath sounds  No wheezing  Musculoskeletal:      Cervical back: Neck supple  Skin:     General: Skin is warm and dry  Neurological:      Mental Status: He is alert  Patient's shoes and socks were not removed  Labs:   Lab Results   Component Value Date    HGBA1C 8 0 (H) 02/10/2023       Lab Results   Component Value Date    HDL 47 05/25/2022    TRIG 124 05/25/2022       Lab Results   Component Value Date    CALCIUM 9 1 02/10/2023    K 3 8 02/10/2023    CO2 24 02/10/2023     02/10/2023    BUN 16 02/10/2023    CREATININE 0 69 02/10/2023        eGFR   Date Value Ref Range Status   02/10/2023 100 ml/min/1 73sq m Final       Lab Results   Component Value Date    ALT 53 05/25/2022    AST 21 05/25/2022    ALKPHOS 51 05/25/2022       Lab Results   Component Value Date    OSY3MNSASZQC 0 199 (L) 05/08/2023    [id number] 11 4 05/25/2022             Plan:    Diagnoses and all orders for this visit:    Post-operative hypothyroidism  TSH recently low at 0 199 and free T4 normal at 1 35  Decrease levothyroxine to 200 mcg 1 tablet daily Monday through Saturday and 1/2 tablet on Sunday  Repeat TFTs in 6 weeks and prior to next visit  -     T4, free;  Future  -     TSH, 3rd generation; Future  -     levothyroxine (Synthroid) 200 mcg tablet; Take 1 tablet daily Monday through Saturday and 1/2 tablet on Sunday  -     T4, free; Future  -     TSH, 3rd generation; Future    Type 2 diabetes mellitus with hyperglycemia, without long-term current use of insulin ([facility] )  Patient prefers management by PCP  Last A1C was 8 0%; since then he has started exercising and has lost 15 lbs  Continue metformin and lifestyle modifications  -     Cancel: POCT hemoglobin A1c    Primary hypertension  Blood pressure is elevated  Advised to monitor BP at home and follow-up with PCP if remaining elevated, at or above 140/90  For now continue current treatment          Discussed with the patient and all questions fully answered  He will call me if any problems arise      Counseled patient on diagnostic results, prognosis, risk and benefit of treatment options, instruction for management, importance of treatment compliance, risk  factor reduction and impressions      Savana Vivar PA-C

## 2023-05-11 ENCOUNTER — OFFICE VISIT (OUTPATIENT)
Dept: ENDOCRINOLOGY | Facility: CLINIC | Age: 63
End: 2023-05-11

## 2023-05-11 VITALS
HEIGHT: 76 IN | DIASTOLIC BLOOD PRESSURE: 90 MMHG | SYSTOLIC BLOOD PRESSURE: 150 MMHG | HEART RATE: 57 BPM | BODY MASS INDEX: 31.49 KG/M2 | WEIGHT: 258.6 LBS

## 2023-05-11 DIAGNOSIS — I10 PRIMARY HYPERTENSION: ICD-10-CM

## 2023-05-11 DIAGNOSIS — E11.65 TYPE 2 DIABETES MELLITUS WITH HYPERGLYCEMIA, WITHOUT LONG-TERM CURRENT USE OF INSULIN (HCC): ICD-10-CM

## 2023-05-11 DIAGNOSIS — E89.0 POST-OPERATIVE HYPOTHYROIDISM: Primary | ICD-10-CM

## 2023-05-11 RX ORDER — MIRABEGRON 50 MG/1
50 TABLET, FILM COATED, EXTENDED RELEASE ORAL DAILY
COMMUNITY
Start: 2023-03-13

## 2023-05-11 RX ORDER — LEVOTHYROXINE SODIUM 0.2 MG/1
TABLET ORAL
Qty: 90 TABLET | Refills: 1 | Status: SHIPPED | OUTPATIENT
Start: 2023-05-11

## 2023-07-10 ENCOUNTER — APPOINTMENT (OUTPATIENT)
Dept: LAB | Age: 63
End: 2023-07-10
Payer: COMMERCIAL

## 2023-07-10 DIAGNOSIS — E89.0 POST-OPERATIVE HYPOTHYROIDISM: ICD-10-CM

## 2023-07-10 LAB
T4 FREE SERPL-MCNC: 1.3 NG/DL (ref 0.61–1.12)
TSH SERPL DL<=0.05 MIU/L-ACNC: 2.77 UIU/ML (ref 0.45–4.5)

## 2023-07-10 PROCEDURE — 84443 ASSAY THYROID STIM HORMONE: CPT

## 2023-07-10 PROCEDURE — 84439 ASSAY OF FREE THYROXINE: CPT

## 2023-07-10 PROCEDURE — 36415 COLL VENOUS BLD VENIPUNCTURE: CPT

## 2023-07-11 DIAGNOSIS — E89.0 POST-OPERATIVE HYPOTHYROIDISM: Primary | ICD-10-CM

## 2023-07-12 ENCOUNTER — APPOINTMENT (OUTPATIENT)
Dept: LAB | Age: 63
End: 2023-07-12
Payer: MEDICARE

## 2023-07-12 DIAGNOSIS — E89.0 POST-OPERATIVE HYPOTHYROIDISM: ICD-10-CM

## 2023-07-12 LAB — T4 FREE SERPL-MCNC: 1.23 NG/DL (ref 0.61–1.12)

## 2023-07-12 PROCEDURE — 84439 ASSAY OF FREE THYROXINE: CPT

## 2023-07-12 PROCEDURE — 36415 COLL VENOUS BLD VENIPUNCTURE: CPT

## 2023-07-14 DIAGNOSIS — E89.0 POST-OPERATIVE HYPOTHYROIDISM: Primary | ICD-10-CM

## 2023-07-14 RX ORDER — LEVOTHYROXINE SODIUM 0.2 MG/1
TABLET ORAL
COMMUNITY
End: 2023-07-17 | Stop reason: SDUPTHER

## 2023-07-17 DIAGNOSIS — E89.0 POST-OPERATIVE HYPOTHYROIDISM: Primary | ICD-10-CM

## 2023-07-17 RX ORDER — LEVOTHYROXINE SODIUM 0.2 MG/1
TABLET ORAL
Qty: 78 TABLET | Refills: 1 | Status: SHIPPED | OUTPATIENT
Start: 2023-07-17

## 2023-07-17 NOTE — TELEPHONE ENCOUNTER
----- Message from Lenard Benitez sent at 7/16/2023 11:22 AM EDT -----  Regarding: Levothyroxine  Contact: [phone]  Please send my prescription for levothyroxine 200 mcg tablet to [facility]

## 2023-08-03 ENCOUNTER — APPOINTMENT (OUTPATIENT)
Dept: LAB | Age: 63
End: 2023-08-03
Payer: MEDICARE

## 2023-08-03 DIAGNOSIS — E11.65 TYPE II DIABETES MELLITUS WITH HYPEROSMOLARITY, UNCONTROLLED (HCC): ICD-10-CM

## 2023-08-03 DIAGNOSIS — I10 ESSENTIAL HYPERTENSION, MALIGNANT: ICD-10-CM

## 2023-08-03 DIAGNOSIS — E11.00 TYPE II DIABETES MELLITUS WITH HYPEROSMOLARITY, UNCONTROLLED (HCC): ICD-10-CM

## 2023-08-03 DIAGNOSIS — Z79.899 ENCOUNTER FOR LONG-TERM (CURRENT) USE OF OTHER MEDICATIONS: ICD-10-CM

## 2023-08-03 DIAGNOSIS — I51.9 MYXEDEMA HEART DISEASE: ICD-10-CM

## 2023-08-03 DIAGNOSIS — E03.9 MYXEDEMA HEART DISEASE: ICD-10-CM

## 2023-08-03 DIAGNOSIS — E78.5 HYPERLIPIDEMIA, UNSPECIFIED HYPERLIPIDEMIA TYPE: ICD-10-CM

## 2023-08-03 LAB
ALBUMIN SERPL BCP-MCNC: 4 G/DL (ref 3.5–5)
ALP SERPL-CCNC: 48 U/L (ref 46–116)
ALT SERPL W P-5'-P-CCNC: 27 U/L (ref 12–78)
ANION GAP SERPL CALCULATED.3IONS-SCNC: 7 MMOL/L
AST SERPL W P-5'-P-CCNC: 8 U/L (ref 5–45)
BILIRUB SERPL-MCNC: 0.68 MG/DL (ref 0.2–1)
BUN SERPL-MCNC: 13 MG/DL (ref 5–25)
CALCIUM SERPL-MCNC: 8.8 MG/DL (ref 8.3–10.1)
CHLORIDE SERPL-SCNC: 106 MMOL/L (ref 96–108)
CHOLEST SERPL-MCNC: 120 MG/DL
CO2 SERPL-SCNC: 26 MMOL/L (ref 21–32)
CREAT SERPL-MCNC: 0.77 MG/DL (ref 0.6–1.3)
EST. AVERAGE GLUCOSE BLD GHB EST-MCNC: 160 MG/DL
GFR SERPL CREATININE-BSD FRML MDRD: 95 ML/MIN/1.73SQ M
GLUCOSE P FAST SERPL-MCNC: 156 MG/DL (ref 65–99)
HBA1C MFR BLD: 7.2 %
HDLC SERPL-MCNC: 51 MG/DL
LDLC SERPL CALC-MCNC: 51 MG/DL (ref 0–100)
NONHDLC SERPL-MCNC: 69 MG/DL
POTASSIUM SERPL-SCNC: 4 MMOL/L (ref 3.5–5.3)
PROT SERPL-MCNC: 7.2 G/DL (ref 6.4–8.4)
SODIUM SERPL-SCNC: 139 MMOL/L (ref 135–147)
TRIGL SERPL-MCNC: 89 MG/DL

## 2023-08-03 PROCEDURE — 83036 HEMOGLOBIN GLYCOSYLATED A1C: CPT

## 2023-08-03 PROCEDURE — 36415 COLL VENOUS BLD VENIPUNCTURE: CPT

## 2023-08-03 PROCEDURE — 80061 LIPID PANEL: CPT

## 2023-08-03 PROCEDURE — 80053 COMPREHEN METABOLIC PANEL: CPT

## 2023-08-25 ENCOUNTER — APPOINTMENT (OUTPATIENT)
Dept: LAB | Age: 63
End: 2023-08-25
Payer: MEDICARE

## 2023-08-25 DIAGNOSIS — E89.0 POST-OPERATIVE HYPOTHYROIDISM: ICD-10-CM

## 2023-08-25 DIAGNOSIS — E03.9 HYPOTHYROIDISM, ADULT: ICD-10-CM

## 2023-08-25 LAB
T4 FREE SERPL-MCNC: 0.89 NG/DL (ref 0.61–1.12)
TSH SERPL DL<=0.05 MIU/L-ACNC: 4.02 UIU/ML (ref 0.45–4.5)

## 2023-08-25 PROCEDURE — 84443 ASSAY THYROID STIM HORMONE: CPT

## 2023-08-25 PROCEDURE — 84439 ASSAY OF FREE THYROXINE: CPT

## 2023-08-25 PROCEDURE — 36415 COLL VENOUS BLD VENIPUNCTURE: CPT

## 2023-11-13 ENCOUNTER — APPOINTMENT (OUTPATIENT)
Dept: LAB | Age: 63
End: 2023-11-13
Payer: MEDICARE

## 2023-11-13 DIAGNOSIS — E89.0 POST-OPERATIVE HYPOTHYROIDISM: ICD-10-CM

## 2023-11-13 LAB
T4 FREE SERPL-MCNC: 0.92 NG/DL (ref 0.61–1.12)
TSH SERPL DL<=0.05 MIU/L-ACNC: 10.53 UIU/ML (ref 0.45–4.5)

## 2023-11-13 PROCEDURE — 84443 ASSAY THYROID STIM HORMONE: CPT

## 2023-11-13 PROCEDURE — 84439 ASSAY OF FREE THYROXINE: CPT

## 2023-11-13 PROCEDURE — 36415 COLL VENOUS BLD VENIPUNCTURE: CPT

## 2023-11-16 ENCOUNTER — OFFICE VISIT (OUTPATIENT)
Dept: ENDOCRINOLOGY | Facility: CLINIC | Age: 63
End: 2023-11-16
Payer: MEDICARE

## 2023-11-16 VITALS
WEIGHT: 267 LBS | HEART RATE: 57 BPM | SYSTOLIC BLOOD PRESSURE: 138 MMHG | OXYGEN SATURATION: 97 % | BODY MASS INDEX: 32.5 KG/M2 | DIASTOLIC BLOOD PRESSURE: 80 MMHG

## 2023-11-16 DIAGNOSIS — I10 PRIMARY HYPERTENSION: ICD-10-CM

## 2023-11-16 DIAGNOSIS — E66.9 CLASS 1 OBESITY WITH SERIOUS COMORBIDITY AND BODY MASS INDEX (BMI) OF 32.0 TO 32.9 IN ADULT, UNSPECIFIED OBESITY TYPE: ICD-10-CM

## 2023-11-16 DIAGNOSIS — E11.65 TYPE 2 DIABETES MELLITUS WITH HYPERGLYCEMIA, WITHOUT LONG-TERM CURRENT USE OF INSULIN (HCC): ICD-10-CM

## 2023-11-16 DIAGNOSIS — E89.0 POST-OPERATIVE HYPOTHYROIDISM: Primary | ICD-10-CM

## 2023-11-16 PROCEDURE — 99214 OFFICE O/P EST MOD 30 MIN: CPT | Performed by: INTERNAL MEDICINE

## 2023-11-16 RX ORDER — LEVOTHYROXINE SODIUM 0.2 MG/1
TABLET ORAL
Qty: 78 TABLET | Refills: 1
Start: 2023-11-16

## 2023-11-16 NOTE — PROGRESS NOTES
Pooja Myles 70 y.o. male MRN: [id number]    Encounter: [id number]      Assessment/Plan     Assessment: This is a 70y.o.-year-old male with hypothyroidism. Plan:  Diagnoses and all orders for this visit:    Post-operative hypothyroidism  TSH is elevated. Free T4 is normal will make change in levothyroxine doses following. Repeat thyroid blood work in 2 months and again in 6 months. Follow-up in 8 months  Educated patient to call if he loses more than 10 pounds or gains more than 10 pounds  -     levothyroxine 200 mcg tablet; Take 1 tablet (200 mcg) Mon-Sat & 1/2 tablet on Sunday  -     T4, free; Future  -     TSH, 3rd generation; Future  -     T4, free; Future  -     TSH, 3rd generation; Future    Type 2 diabetes mellitus with hyperglycemia, without long-term current use of insulin (HCC)  A1c well controlled, currently managed by PCP. To follow lifestyle modifications, continue metformin  Primary hypertension  Blood pressure well controlled, continue current management  Class 1 obesity with serious comorbidity and body mass index (BMI) of 32.0 to 32.9 in adult, unspecified obesity type  Discussed importance of weight loss, lifestyle modifications, dietary modifications such as cutting down on free sugars, free carbohydrates, as well as saturated fats  Discussed to increase serving of fruits and vegetables in diet(3-5 servings per day)    Discussed the importance of exercise routine, such as walking half an hour 5 days a week    CC: Diabetes    History of Present Illness     HPI:    Pooja Myles is a 40-year-old gentleman with medical history of type 2 diabetes, obesity, multinodular goiter, hypothyroidism is here for follow-up. He underwent total thyroidectomy in December 2021, pathology report was benign goiter.     For hypothyroidism he takes levothyroxine 200 mcg p.o. daily on empty stomach 1 hour before breakfast from Monday through Saturday and no tablet on Sunday  Dose was changed as he had lost 15 pounds in summer  Last TSH was 10.5. He denies missing doses. He denies taking over-the-counter biotin or herbal supplementation. He denies weight gain, cold intolerance, constipation. For type 2 diabetes he takes metformin and follows lifestyle modifications. Currently managed by PCP  Lab Results   Component Value Date    DGE3IUZHAQXW 10.532 (H) 11/13/2023    O5NUVGN 11.4 05/25/2022     Lab Results   Component Value Date    HGBA1C 7.2 (H) 08/03/2023     Wt Readings from Last 3 Encounters:   11/16/23 121 kg (267 lb)   05/11/23 117 kg (258 lb 9.6 oz)   11/04/22 123 kg (272 lb)         Review of Systems   Constitutional:  Positive for fatigue and unexpected weight change. Negative for activity change, diaphoresis and fever. HENT: Negative. Eyes:  Negative for visual disturbance. Respiratory:  Negative for cough, chest tightness and shortness of breath. Cardiovascular:  Negative for chest pain, palpitations and leg swelling. Gastrointestinal:  Positive for constipation. Negative for abdominal pain, blood in stool, diarrhea, nausea and vomiting. Endocrine: Positive for cold intolerance. Negative for heat intolerance, polydipsia, polyphagia and polyuria. Genitourinary:  Negative for dysuria, enuresis, frequency and urgency. Musculoskeletal:  Negative for arthralgias and myalgias. Skin:  Negative for pallor, rash and wound. Allergic/Immunologic: Negative. Neurological:  Negative for dizziness, tremors, weakness and numbness. Hematological: Negative. Psychiatric/Behavioral: Negative.          Historical Information   Past Medical History:   Diagnosis Date    Hyperlipidemia     Hypertension     Type 2 diabetes mellitus ([address])      Past Surgical History:   Procedure Laterality Date    COLONOSCOPY      ME THYROIDECTOMY TOTAL/COMPLETE N/A 12/17/2021    Procedure: TOTAL THYROIDECTOMY (COMPLICATED), REMOVAL SUBSTERNAL GOITER WITH NERVE MONITORING;  Surgeon: Radha Alfaro MD; Location: [facility];  Service: ENT     Social History   Social History     Substance and Sexual Activity   Alcohol Use Yes    Alcohol/week: 12.0 standard drinks of alcohol    Types: 12 Cans of beer per week     Social History     Substance and Sexual Activity   Drug Use Never     Social History     Tobacco Use   Smoking Status Former    Packs/day: 1.00    Years: 20.00    Total pack years: 20.00    Types: Cigarettes    Quit date: 3/27/2001    Years since quittin.6   Smokeless Tobacco Never   Tobacco Comments    occ smoked pipes     Family History:   Family History   Problem Relation Age of Onset    Diabetes Mother     Hypertension Mother        Meds/Allergies   Current Outpatient Medications   Medication Sig Dispense Refill    amLODIPine-atorvastatin (CADUET) 10-10 MG per tablet Take 1 tablet by mouth daily in the early morning        aspirin (ECOTRIN LOW STRENGTH) 81 mg EC tablet Take 1 tablet (81 mg total) by mouth daily  0    atenolol (TENORMIN) 100 mg tablet Take 100 mg by mouth daily in the early morning        Cholecalciferol (D3) 50 MCG (2000 UT) TABS Take 2,000 Units by mouth daily      levothyroxine 200 mcg tablet Take 1 tablet (200 mcg) Mon-Sat & 1/2 tablet on  78 tablet 1    lisinopril-hydrochlorothiazide (PRINZIDE,ZESTORETIC) 20-25 MG per tablet Take 1 tablet by mouth daily in the early morning        metFORMIN (GLUCOPHAGE) 1000 MG tablet Take 1,000 mg by mouth 2 (two) times a day with meals        Myrbetriq 50 MG TB24 Take 50 mg by mouth daily       No current facility-administered medications for this visit. No Known Allergies    Objective   Vitals: Blood pressure 138/80, pulse 57, weight 121 kg (267 lb), SpO2 97 %. Physical Exam  Vitals reviewed. Constitutional:       General: He is not in acute distress. Appearance: He is well-developed. HENT:      Head: Normocephalic and atraumatic. Eyes:      Pupils: Pupils are equal, round, and reactive to light.    Neck:      Thyroid: No thyromegaly. Cardiovascular:      Rate and Rhythm: Normal rate and regular rhythm. Heart sounds: Normal heart sounds. Pulmonary:      Effort: Pulmonary effort is normal. No respiratory distress. Breath sounds: Normal breath sounds. Musculoskeletal:         General: No deformity. Normal range of motion. Cervical back: Normal range of motion and neck supple. Skin:     General: Skin is warm and dry. Findings: No erythema. Neurological:      Mental Status: He is alert and oriented to person, place, and time. The history was obtained from the review of the chart, patient. Lab Results:   Lab Results   Component Value Date/Time    Hemoglobin A1C 7.2 (H) 08/03/2023 06:52 AM    Hemoglobin A1C 8.0 (H) 02/10/2023 06:50 AM    BUN 13 08/03/2023 06:52 AM    BUN 16 02/10/2023 06:50 AM    Potassium 4.0 08/03/2023 06:52 AM    Potassium 3.8 02/10/2023 06:50 AM    Chloride 106 08/03/2023 06:52 AM    Chloride 106 02/10/2023 06:50 AM    CO2 26 08/03/2023 06:52 AM    CO2 24 02/10/2023 06:50 AM    Creatinine 0.77 08/03/2023 06:52 AM    Creatinine 0.69 02/10/2023 06:50 AM    AST 8 08/03/2023 06:52 AM    ALT 27 08/03/2023 06:52 AM    Total Protein 7.2 08/03/2023 06:52 AM    Albumin 4.0 08/03/2023 06:52 AM    HDL, Direct 51 08/03/2023 06:52 AM    Triglycerides 89 08/03/2023 06:52 AM           Imaging Studies: I have personally reviewed pertinent reports. Portions of the record may have been created with voice recognition software. Occasional wrong word or "sound a like" substitutions may have occurred due to the inherent limitations of voice recognition software. Read the chart carefully and recognize, using context, where substitutions have occurred.

## 2023-12-06 DIAGNOSIS — E89.0 POST-OPERATIVE HYPOTHYROIDISM: ICD-10-CM

## 2023-12-06 RX ORDER — LEVOTHYROXINE SODIUM 0.2 MG/1
TABLET ORAL
Qty: 78 TABLET | Refills: 1 | Status: SHIPPED | OUTPATIENT
Start: 2023-12-06

## 2024-01-17 ENCOUNTER — APPOINTMENT (OUTPATIENT)
Dept: LAB | Age: 64
End: 2024-01-17
Payer: MEDICARE

## 2024-01-17 DIAGNOSIS — E89.0 POST-OPERATIVE HYPOTHYROIDISM: ICD-10-CM

## 2024-01-17 LAB
T4 FREE SERPL-MCNC: 1.1 NG/DL (ref 0.61–1.12)
TSH SERPL DL<=0.05 MIU/L-ACNC: 7.94 UIU/ML (ref 0.45–4.5)

## 2024-01-17 PROCEDURE — 84443 ASSAY THYROID STIM HORMONE: CPT

## 2024-01-17 PROCEDURE — 84439 ASSAY OF FREE THYROXINE: CPT

## 2024-01-17 PROCEDURE — 36415 COLL VENOUS BLD VENIPUNCTURE: CPT

## 2024-01-22 ENCOUNTER — TELEPHONE (OUTPATIENT)
Dept: ENDOCRINOLOGY | Facility: CLINIC | Age: 64
End: 2024-01-22

## 2024-01-22 DIAGNOSIS — E89.0 POST-OPERATIVE HYPOTHYROIDISM: Primary | ICD-10-CM

## 2024-01-22 RX ORDER — LEVOTHYROXINE SODIUM 175 UG/1
TABLET ORAL
Qty: 30 TABLET | Refills: 5 | Status: SHIPPED | OUTPATIENT
Start: 2024-01-22

## 2024-01-22 NOTE — TELEPHONE ENCOUNTER
Sent the prescription for 175 mcg daily of levothyroxine.  MyChart message was sent to patient  Blood work was ordered for 6 weeks to be done

## 2024-02-08 ENCOUNTER — APPOINTMENT (OUTPATIENT)
Dept: LAB | Age: 64
End: 2024-02-08
Payer: MEDICARE

## 2024-02-08 DIAGNOSIS — I10 ESSENTIAL HYPERTENSION, MALIGNANT: ICD-10-CM

## 2024-02-08 DIAGNOSIS — E11.65 TYPE II DIABETES MELLITUS WITH HYPEROSMOLARITY, UNCONTROLLED (HCC): ICD-10-CM

## 2024-02-08 DIAGNOSIS — E11.00 TYPE II DIABETES MELLITUS WITH HYPEROSMOLARITY, UNCONTROLLED (HCC): ICD-10-CM

## 2024-02-08 DIAGNOSIS — E55.9 AVITAMINOSIS D: ICD-10-CM

## 2024-02-08 DIAGNOSIS — Z79.899 ENCOUNTER FOR LONG-TERM (CURRENT) USE OF OTHER MEDICATIONS: ICD-10-CM

## 2024-02-08 LAB
25(OH)D3 SERPL-MCNC: 36 NG/ML (ref 30–100)
ANION GAP SERPL CALCULATED.3IONS-SCNC: 9 MMOL/L
BUN SERPL-MCNC: 14 MG/DL (ref 5–25)
CALCIUM SERPL-MCNC: 9.4 MG/DL (ref 8.4–10.2)
CHLORIDE SERPL-SCNC: 100 MMOL/L (ref 96–108)
CO2 SERPL-SCNC: 29 MMOL/L (ref 21–32)
CREAT SERPL-MCNC: 0.7 MG/DL (ref 0.6–1.3)
EST. AVERAGE GLUCOSE BLD GHB EST-MCNC: 154 MG/DL
GFR SERPL CREATININE-BSD FRML MDRD: 99 ML/MIN/1.73SQ M
GLUCOSE P FAST SERPL-MCNC: 134 MG/DL (ref 65–99)
HBA1C MFR BLD: 7 %
POTASSIUM SERPL-SCNC: 4.3 MMOL/L (ref 3.5–5.3)
SODIUM SERPL-SCNC: 138 MMOL/L (ref 135–147)

## 2024-02-08 PROCEDURE — 80048 BASIC METABOLIC PNL TOTAL CA: CPT

## 2024-02-08 PROCEDURE — 82306 VITAMIN D 25 HYDROXY: CPT

## 2024-02-08 PROCEDURE — 83036 HEMOGLOBIN GLYCOSYLATED A1C: CPT

## 2024-02-08 PROCEDURE — 36415 COLL VENOUS BLD VENIPUNCTURE: CPT

## 2024-03-04 ENCOUNTER — LAB (OUTPATIENT)
Dept: LAB | Age: 64
End: 2024-03-04
Payer: MEDICARE

## 2024-03-04 DIAGNOSIS — E89.0 POST-OPERATIVE HYPOTHYROIDISM: ICD-10-CM

## 2024-03-04 LAB
T4 FREE SERPL-MCNC: 1.09 NG/DL (ref 0.61–1.12)
TSH SERPL DL<=0.05 MIU/L-ACNC: 3.51 UIU/ML (ref 0.45–4.5)

## 2024-03-04 PROCEDURE — 84439 ASSAY OF FREE THYROXINE: CPT

## 2024-03-04 PROCEDURE — 84443 ASSAY THYROID STIM HORMONE: CPT

## 2024-03-04 PROCEDURE — 36415 COLL VENOUS BLD VENIPUNCTURE: CPT

## 2024-03-12 DIAGNOSIS — E89.0 POST-OPERATIVE HYPOTHYROIDISM: ICD-10-CM

## 2024-03-12 RX ORDER — LEVOTHYROXINE SODIUM 175 UG/1
TABLET ORAL
Qty: 90 TABLET | Refills: 1 | Status: SHIPPED | OUTPATIENT
Start: 2024-03-12

## 2024-07-16 ENCOUNTER — TELEPHONE (OUTPATIENT)
Dept: ENDOCRINOLOGY | Facility: CLINIC | Age: 64
End: 2024-07-16

## 2024-07-16 DIAGNOSIS — E11.65 TYPE 2 DIABETES MELLITUS WITH HYPERGLYCEMIA, WITHOUT LONG-TERM CURRENT USE OF INSULIN (HCC): Primary | ICD-10-CM

## 2024-07-16 DIAGNOSIS — I10 PRIMARY HYPERTENSION: ICD-10-CM

## 2024-07-16 DIAGNOSIS — E89.0 POST-OPERATIVE HYPOTHYROIDISM: ICD-10-CM

## 2024-07-22 ENCOUNTER — LAB (OUTPATIENT)
Dept: LAB | Age: 64
End: 2024-07-22
Payer: MEDICARE

## 2024-07-22 DIAGNOSIS — Z12.5 SPECIAL SCREENING FOR MALIGNANT NEOPLASM OF PROSTATE: ICD-10-CM

## 2024-07-22 DIAGNOSIS — E89.0 POST-OPERATIVE HYPOTHYROIDISM: ICD-10-CM

## 2024-07-22 LAB
PSA SERPL-MCNC: 2.48 NG/ML (ref 0–4)
T4 FREE SERPL-MCNC: 1.12 NG/DL (ref 0.61–1.12)
TSH SERPL DL<=0.05 MIU/L-ACNC: 4.53 UIU/ML (ref 0.45–4.5)

## 2024-07-22 PROCEDURE — 84439 ASSAY OF FREE THYROXINE: CPT

## 2024-07-22 PROCEDURE — 84443 ASSAY THYROID STIM HORMONE: CPT

## 2024-07-22 PROCEDURE — G0103 PSA SCREENING: HCPCS

## 2024-07-22 PROCEDURE — 36415 COLL VENOUS BLD VENIPUNCTURE: CPT

## 2024-07-22 PROCEDURE — 84153 ASSAY OF PSA TOTAL: CPT

## 2024-07-24 ENCOUNTER — OFFICE VISIT (OUTPATIENT)
Dept: ENDOCRINOLOGY | Facility: CLINIC | Age: 64
End: 2024-07-24
Payer: MEDICARE

## 2024-07-24 VITALS
HEIGHT: 77 IN | WEIGHT: 247.8 LBS | BODY MASS INDEX: 29.26 KG/M2 | SYSTOLIC BLOOD PRESSURE: 130 MMHG | OXYGEN SATURATION: 98 % | HEART RATE: 62 BPM | DIASTOLIC BLOOD PRESSURE: 82 MMHG

## 2024-07-24 DIAGNOSIS — I10 PRIMARY HYPERTENSION: ICD-10-CM

## 2024-07-24 DIAGNOSIS — E11.65 TYPE 2 DIABETES MELLITUS WITH HYPERGLYCEMIA, WITHOUT LONG-TERM CURRENT USE OF INSULIN (HCC): ICD-10-CM

## 2024-07-24 DIAGNOSIS — E89.0 POST-OPERATIVE HYPOTHYROIDISM: Primary | ICD-10-CM

## 2024-07-24 DIAGNOSIS — E55.9 VITAMIN D DEFICIENCY: ICD-10-CM

## 2024-07-24 DIAGNOSIS — E78.2 MIXED HYPERLIPIDEMIA: ICD-10-CM

## 2024-07-24 PROCEDURE — 99214 OFFICE O/P EST MOD 30 MIN: CPT | Performed by: PHYSICIAN ASSISTANT

## 2024-07-24 RX ORDER — LEVOTHYROXINE SODIUM 175 UG/1
TABLET ORAL
Qty: 90 TABLET | Refills: 1 | Status: SHIPPED | OUTPATIENT
Start: 2024-07-24

## 2024-07-24 NOTE — PROGRESS NOTES
Patient Progress Note      CC: hypothyroidism      Referring Provider  Jomar Hamlin, Do  [address]  [address]  [place] [address]     History of Present Illness:   Wes Alonso is a 63 y.o. male here for follow-up of postsurgical hypothyroidism.  He has a history of multinodular goiter and pathology was benign. Patient is on levothyroxine 175 mcg 1 tablet daily.  Patient is taking it 1 hr before breakfast on an empty stomach and at least 4 hrs apart from supplements.  Tolerating medication well. No history of external radiation to head/neck/chest.  No family history of thyroid cancer. No recent Iodine loading in form of medication, biotin or kelp supplements or radiological diagnostic studies. TSH 4.533 and free T4 normal at 1.12.  He does have a history of diabetes which is he prefers is managed by PCP.  Last A1c was 7%.  He is on metformin.  He takes an ACE inhibitor and statin.   Vitamin D deficiency: he is taking vitamin D3 2000 IU daily.    Patient Active Problem List   Diagnosis    HTN (hypertension)    Hyperlipidemia    Diabetes mellitus, type 2 (HCC)    Class 1 obesity in adult    Post-operative hypothyroidism    History of total thyroidectomy    Dysphonia    Paralysis of left vocal fold    Glottic insufficiency    Muscle tension dysphonia    Reflux laryngitis    Seasonal allergies    Gastroesophageal reflux disease    Vitamin D deficiency      Past Medical History:   Diagnosis Date    Hyperlipidemia     Hypertension     Type 2 diabetes mellitus (HCC)       Past Surgical History:   Procedure Laterality Date    COLONOSCOPY      MT THYROIDECTOMY TOTAL/COMPLETE N/A 12/17/2021    Procedure: TOTAL THYROIDECTOMY (COMPLICATED), REMOVAL SUBSTERNAL GOITER WITH NERVE MONITORING;  Surgeon: Merritt Seaman MD;  Location: [facility];  Service: ENT      Family History   Problem Relation Age of Onset    Diabetes Mother     Hypertension Mother      Social History     Tobacco Use    Smoking status: Former      Current packs/day: 0.00     Average packs/day: 1 pack/day for 20.0 years (20.0 ttl pk-yrs)     Types: Cigarettes     Start date: 3/27/1981     Quit date: 3/27/2001     Years since quittin.3    Smokeless tobacco: Never    Tobacco comments:     occ smoked pipes   Substance Use Topics    Alcohol use: Yes     Alcohol/week: 12.0 standard drinks of alcohol     Types: 12 Cans of beer per week     No Known Allergies      Current Outpatient Medications:     amLODIPine-atorvastatin (CADUET) 10-10 MG per tablet, Take 1 tablet by mouth daily in the early morning  , Disp: , Rfl:     aspirin (ECOTRIN LOW STRENGTH) 81 mg EC tablet, Take 1 tablet (81 mg total) by mouth daily, Disp: , Rfl: 0    atenolol (TENORMIN) 100 mg tablet, Take 100 mg by mouth daily in the early morning  , Disp: , Rfl:     Cholecalciferol (D3) 50 MCG (2000 UT) TABS, Take 2,000 Units by mouth daily, Disp: , Rfl:     levothyroxine 175 mcg tablet, TAKE 1 TABLET DAILY ON EMPTY STOMACH 1 HOUR BEFORE BREAKFAST AND 4 HOURS APART FROM CALCIUM AND VITAMIN D SUPPLEMENTATION, Disp: 90 tablet, Rfl: 1    lisinopril-hydrochlorothiazide (PRINZIDE,ZESTORETIC) 20-25 MG per tablet, Take 1 tablet by mouth daily in the early morning  , Disp: , Rfl:     metFORMIN (GLUCOPHAGE) 1000 MG tablet, Take 1,000 mg by mouth 2 (two) times a day with meals  , Disp: , Rfl:     Myrbetriq 50 MG TB24, Take 50 mg by mouth daily, Disp: , Rfl:   Review of Systems   Constitutional:  Negative for activity change, appetite change, fatigue and unexpected weight change.   HENT:  Negative for trouble swallowing.    Eyes:  Negative for visual disturbance.   Respiratory:  Negative for shortness of breath.    Cardiovascular:  Negative for chest pain and palpitations.   Gastrointestinal:  Negative for constipation and diarrhea.   Endocrine: Negative for cold intolerance and heat intolerance.   Musculoskeletal: Negative.    Skin: Negative.    Neurological:  Negative for tremors.  "  Psychiatric/Behavioral: Negative.         Physical Exam:  Body mass index is 29.38 kg/m².  /82   Pulse 62   Ht 6' 5\" (1.956 m)   Wt 112 kg (247 lb 12.8 oz)   SpO2 98%   BMI 29.38 kg/m²    Wt Readings from Last 3 Encounters:   07/24/24 112 kg (247 lb 12.8 oz)   11/16/23 121 kg (267 lb)   05/11/23 117 kg (258 lb 9.6 oz)       Physical Exam  Vitals and nursing note reviewed.   Constitutional:       Appearance: He is well-developed.   HENT:      Head: Normocephalic.   Eyes:      General: No scleral icterus.     Extraocular Movements: EOM normal.   Neck:      Thyroid: No thyromegaly.   Cardiovascular:      Rate and Rhythm: Normal rate and regular rhythm.      Pulses:           Radial pulses are 2+ on the right side and 2+ on the left side.      Heart sounds: No murmur heard.  Pulmonary:      Effort: Pulmonary effort is normal. No respiratory distress.      Breath sounds: Normal breath sounds. No wheezing.   Musculoskeletal:      Cervical back: Neck supple.   Skin:     General: Skin is warm and dry.   Neurological:      Mental Status: He is alert.      Deep Tendon Reflexes: Reflexes are normal and symmetric.   Psychiatric:         Mood and Affect: Mood and affect normal.       Patient's shoes and socks were not removed.          Labs:   Lab Results   Component Value Date    HGBA1C 7.0 (H) 02/08/2024     Lab Results   Component Value Date    CALCIUM 9.4 02/08/2024    K 4.3 02/08/2024    CO2 29 02/08/2024     02/08/2024    BUN 14 02/08/2024    CREATININE 0.70 02/08/2024     No results found for: \"MICROALBUR\", \"[id number]\"  eGFR   Date Value Ref Range Status   02/08/2024 99 ml/min/1.73sq m Final     No components found for: \"MALBCRER\"  Lab Results   Component Value Date    HDL 51 08/03/2023    TRIG 89 08/03/2023     Lab Results   Component Value Date    ALT 27 08/03/2023    AST 8 08/03/2023    ALKPHOS 48 08/03/2023     Lab Results   Component Value Date    PZY0TXQTXISB 4.533 (H) 07/22/2024    O1RZSLS 11.4 " 05/25/2022           Plan:    Diagnoses and all orders for this visit:    Post-operative hypothyroidism  TSH 4.533 and free T4 normal at 1.12  TSH is very minimally elevated but patient is asymptomatic  Continue current dose of levothyroxine   Call for symptoms of hypothyroidism  Monitor labs   -     levothyroxine 175 mcg tablet; TAKE 1 TABLET DAILY ON EMPTY STOMACH 1 HOUR BEFORE BREAKFAST AND 4 HOURS APART FROM CALCIUM AND VITAMIN D SUPPLEMENTATION  -     T4, free; Future  -     TSH, 3rd generation; Future    Type 2 diabetes mellitus with hyperglycemia, without long-term current use of insulin ([facility])  Last A1c 7%  On metformin  Managed by PCP    Primary hypertension  Blood pressure adequately controlled, continue current treatment    Mixed hyperlipidemia  LDL previously 51  On statin therapy  Managed by PCP    Vitamin D deficiency  Vitamin D previously normal at 36  Continue current supplementation        Discussed with the patient diagnosis and treatment and all questions fully answered. He will call me if any problems arise.    Counseled patient on diagnostic results, prognosis, risk and benefit of treatment options, instruction for management, importance of treatment compliance, risk factor reduction and impressions.      Savana Vivar PA-C

## 2024-08-02 ENCOUNTER — APPOINTMENT (OUTPATIENT)
Dept: LAB | Age: 64
End: 2024-08-02
Payer: MEDICARE

## 2024-08-02 DIAGNOSIS — Z79.899 ENCOUNTER FOR LONG-TERM (CURRENT) USE OF OTHER MEDICATIONS: ICD-10-CM

## 2024-08-02 DIAGNOSIS — E78.5 HYPERLIPIDEMIA, UNSPECIFIED HYPERLIPIDEMIA TYPE: ICD-10-CM

## 2024-08-02 DIAGNOSIS — E55.9 AVITAMINOSIS D: ICD-10-CM

## 2024-08-02 DIAGNOSIS — E11.65 INADEQUATELY CONTROLLED DIABETES MELLITUS (HCC): ICD-10-CM

## 2024-08-02 DIAGNOSIS — I10 ESSENTIAL HYPERTENSION, MALIGNANT: ICD-10-CM

## 2024-08-02 LAB
25(OH)D3 SERPL-MCNC: 54.3 NG/ML (ref 30–100)
ALBUMIN SERPL BCG-MCNC: 4.1 G/DL (ref 3.5–5)
ALP SERPL-CCNC: 52 U/L (ref 34–104)
ALT SERPL W P-5'-P-CCNC: 25 U/L (ref 7–52)
ANION GAP SERPL CALCULATED.3IONS-SCNC: 10 MMOL/L (ref 4–13)
AST SERPL W P-5'-P-CCNC: 15 U/L (ref 13–39)
BILIRUB SERPL-MCNC: 0.86 MG/DL (ref 0.2–1)
BUN SERPL-MCNC: 13 MG/DL (ref 5–25)
CALCIUM SERPL-MCNC: 9.1 MG/DL (ref 8.4–10.2)
CHLORIDE SERPL-SCNC: 100 MMOL/L (ref 96–108)
CHOLEST SERPL-MCNC: 134 MG/DL
CO2 SERPL-SCNC: 28 MMOL/L (ref 21–32)
CREAT SERPL-MCNC: 0.63 MG/DL (ref 0.6–1.3)
CREAT UR-MCNC: 143.2 MG/DL
EST. AVERAGE GLUCOSE BLD GHB EST-MCNC: 117 MG/DL
GFR SERPL CREATININE-BSD FRML MDRD: 102 ML/MIN/1.73SQ M
GLUCOSE P FAST SERPL-MCNC: 150 MG/DL (ref 65–99)
HBA1C MFR BLD: 5.7 %
HDLC SERPL-MCNC: 49 MG/DL
LDLC SERPL CALC-MCNC: 63 MG/DL (ref 0–100)
NONHDLC SERPL-MCNC: 85 MG/DL
POTASSIUM SERPL-SCNC: 4.1 MMOL/L (ref 3.5–5.3)
PROT SERPL-MCNC: 6.8 G/DL (ref 6.4–8.4)
PROT UR-MCNC: 28 MG/DL
PROT/CREAT UR: 0.2 MG/G{CREAT} (ref 0–0.1)
SODIUM SERPL-SCNC: 138 MMOL/L (ref 135–147)
TRIGL SERPL-MCNC: 112 MG/DL

## 2024-08-02 PROCEDURE — 80053 COMPREHEN METABOLIC PANEL: CPT

## 2024-08-02 PROCEDURE — 84156 ASSAY OF PROTEIN URINE: CPT

## 2024-08-02 PROCEDURE — 82570 ASSAY OF URINE CREATININE: CPT

## 2024-08-02 PROCEDURE — 83036 HEMOGLOBIN GLYCOSYLATED A1C: CPT

## 2024-08-02 PROCEDURE — 82306 VITAMIN D 25 HYDROXY: CPT

## 2024-08-02 PROCEDURE — 80061 LIPID PANEL: CPT

## 2024-08-02 PROCEDURE — 36415 COLL VENOUS BLD VENIPUNCTURE: CPT

## 2024-11-12 ENCOUNTER — APPOINTMENT (OUTPATIENT)
Dept: LAB | Age: 64
End: 2024-11-12
Payer: MEDICARE

## 2024-11-12 DIAGNOSIS — N06.0 ISOLATED PROTEINURIA WITH MINOR GLOMERULAR ABNORMALITY: ICD-10-CM

## 2024-11-12 DIAGNOSIS — E11.65 TYPE 2 DIABETES MELLITUS WITH HYPERGLYCEMIA, WITHOUT LONG-TERM CURRENT USE OF INSULIN (HCC): ICD-10-CM

## 2024-11-12 DIAGNOSIS — E08.00 DIABETES MELLITUS DUE TO UNDERLYING CONDITION WITH HYPEROSMOLARITY WITHOUT COMA, WITHOUT LONG-TERM CURRENT USE OF INSULIN (HCC): ICD-10-CM

## 2024-11-12 DIAGNOSIS — Z79.899 NEED FOR PROPHYLACTIC CHEMOTHERAPY: ICD-10-CM

## 2024-11-12 DIAGNOSIS — I10 ESSENTIAL HYPERTENSION, MALIGNANT: ICD-10-CM

## 2024-11-12 LAB
ANION GAP SERPL CALCULATED.3IONS-SCNC: 9 MMOL/L (ref 4–13)
BUN SERPL-MCNC: 13 MG/DL (ref 5–25)
CALCIUM SERPL-MCNC: 8.7 MG/DL (ref 8.4–10.2)
CHLORIDE SERPL-SCNC: 100 MMOL/L (ref 96–108)
CO2 SERPL-SCNC: 29 MMOL/L (ref 21–32)
CREAT SERPL-MCNC: 0.65 MG/DL (ref 0.6–1.3)
CREAT UR-MCNC: 104.2 MG/DL
GFR SERPL CREATININE-BSD FRML MDRD: 101 ML/MIN/1.73SQ M
GLUCOSE P FAST SERPL-MCNC: 139 MG/DL (ref 65–99)
POTASSIUM SERPL-SCNC: 3.7 MMOL/L (ref 3.5–5.3)
PROT UR-MCNC: 9.7 MG/DL
PROT/CREAT UR: 0.1 MG/G{CREAT} (ref 0–0.1)
SODIUM SERPL-SCNC: 138 MMOL/L (ref 135–147)

## 2024-11-12 PROCEDURE — 36415 COLL VENOUS BLD VENIPUNCTURE: CPT

## 2024-11-12 PROCEDURE — 80048 BASIC METABOLIC PNL TOTAL CA: CPT

## 2024-11-12 PROCEDURE — 82570 ASSAY OF URINE CREATININE: CPT

## 2024-11-12 PROCEDURE — 84156 ASSAY OF PROTEIN URINE: CPT

## 2024-12-15 DIAGNOSIS — E89.0 POST-OPERATIVE HYPOTHYROIDISM: ICD-10-CM

## 2024-12-16 RX ORDER — LEVOTHYROXINE SODIUM 175 UG/1
TABLET ORAL
Qty: 90 TABLET | Refills: 1 | Status: SHIPPED | OUTPATIENT
Start: 2024-12-16

## 2025-02-14 ENCOUNTER — APPOINTMENT (OUTPATIENT)
Dept: LAB | Age: 65
End: 2025-02-14
Payer: MEDICARE

## 2025-02-14 DIAGNOSIS — E78.5 HYPERLIPIDEMIA, UNSPECIFIED HYPERLIPIDEMIA TYPE: ICD-10-CM

## 2025-02-14 DIAGNOSIS — E11.9 TYPE 2 DIABETES MELLITUS WITHOUT COMPLICATION, UNSPECIFIED WHETHER LONG TERM INSULIN USE (HCC): ICD-10-CM

## 2025-02-14 DIAGNOSIS — E55.9 VITAMIN D DEFICIENCY, UNSPECIFIED: ICD-10-CM

## 2025-02-14 DIAGNOSIS — I10 ESSENTIAL (PRIMARY) HYPERTENSION: ICD-10-CM

## 2025-02-14 DIAGNOSIS — E03.9 HYPOTHYROIDISM, UNSPECIFIED TYPE: ICD-10-CM

## 2025-02-14 LAB
25(OH)D3 SERPL-MCNC: 32 NG/ML (ref 30–100)
ALBUMIN SERPL BCG-MCNC: 4.3 G/DL (ref 3.5–5)
ALP SERPL-CCNC: 53 U/L (ref 34–104)
ALT SERPL W P-5'-P-CCNC: 32 U/L (ref 7–52)
ANION GAP SERPL CALCULATED.3IONS-SCNC: 8 MMOL/L (ref 4–13)
AST SERPL W P-5'-P-CCNC: 16 U/L (ref 13–39)
BILIRUB SERPL-MCNC: 0.95 MG/DL (ref 0.2–1)
BUN SERPL-MCNC: 13 MG/DL (ref 5–25)
CALCIUM SERPL-MCNC: 9.2 MG/DL (ref 8.4–10.2)
CHLORIDE SERPL-SCNC: 102 MMOL/L (ref 96–108)
CHOLEST SERPL-MCNC: 120 MG/DL (ref ?–200)
CO2 SERPL-SCNC: 29 MMOL/L (ref 21–32)
CREAT SERPL-MCNC: 0.67 MG/DL (ref 0.6–1.3)
CREAT UR-MCNC: 149.8 MG/DL
EST. AVERAGE GLUCOSE BLD GHB EST-MCNC: 137 MG/DL
GFR SERPL CREATININE-BSD FRML MDRD: 100 ML/MIN/1.73SQ M
GLUCOSE P FAST SERPL-MCNC: 160 MG/DL (ref 65–99)
HBA1C MFR BLD: 6.4 %
HDLC SERPL-MCNC: 38 MG/DL
LDLC SERPL CALC-MCNC: 58 MG/DL (ref 0–100)
NONHDLC SERPL-MCNC: 82 MG/DL
POTASSIUM SERPL-SCNC: 4 MMOL/L (ref 3.5–5.3)
PROT SERPL-MCNC: 6.7 G/DL (ref 6.4–8.4)
PROT UR-MCNC: 72.1 MG/DL
PROT/CREAT UR: 0.5 MG/G{CREAT} (ref 0–0.1)
SODIUM SERPL-SCNC: 139 MMOL/L (ref 135–147)
T3 SERPL-MCNC: 1.2 NG/ML (ref 0.9–1.8)
T4 SERPL-MCNC: 8.5 UG/DL (ref 6.09–12.23)
TRIGL SERPL-MCNC: 118 MG/DL (ref ?–150)
TSH SERPL DL<=0.05 MIU/L-ACNC: 9.64 UIU/ML (ref 0.45–4.5)

## 2025-02-14 PROCEDURE — 80053 COMPREHEN METABOLIC PANEL: CPT

## 2025-02-14 PROCEDURE — 84156 ASSAY OF PROTEIN URINE: CPT

## 2025-02-14 PROCEDURE — 83036 HEMOGLOBIN GLYCOSYLATED A1C: CPT

## 2025-02-14 PROCEDURE — 36415 COLL VENOUS BLD VENIPUNCTURE: CPT

## 2025-02-14 PROCEDURE — 80061 LIPID PANEL: CPT

## 2025-02-14 PROCEDURE — 84436 ASSAY OF TOTAL THYROXINE: CPT

## 2025-02-14 PROCEDURE — 84443 ASSAY THYROID STIM HORMONE: CPT

## 2025-02-14 PROCEDURE — 82570 ASSAY OF URINE CREATININE: CPT

## 2025-02-14 PROCEDURE — 82306 VITAMIN D 25 HYDROXY: CPT

## 2025-02-14 PROCEDURE — 84480 ASSAY TRIIODOTHYRONINE (T3): CPT

## 2025-04-17 ENCOUNTER — APPOINTMENT (OUTPATIENT)
Dept: LAB | Age: 65
End: 2025-04-17
Attending: FAMILY MEDICINE
Payer: MEDICARE

## 2025-04-17 DIAGNOSIS — E08.00 DIABETES MELLITUS DUE TO UNDERLYING CONDITION WITH HYPEROSMOLARITY WITHOUT COMA, WITHOUT LONG-TERM CURRENT USE OF INSULIN (HCC): ICD-10-CM

## 2025-04-17 DIAGNOSIS — I10 PRIMARY HYPERTENSION: ICD-10-CM

## 2025-04-17 DIAGNOSIS — I51.9 MYXEDEMA HEART DISEASE: ICD-10-CM

## 2025-04-17 DIAGNOSIS — E11.65 INADEQUATELY CONTROLLED DIABETES MELLITUS (HCC): ICD-10-CM

## 2025-04-17 DIAGNOSIS — E03.9 MYXEDEMA HEART DISEASE: ICD-10-CM

## 2025-04-17 DIAGNOSIS — E78.2 MIXED HYPERLIPIDEMIA: ICD-10-CM

## 2025-04-17 LAB
ALBUMIN SERPL BCG-MCNC: 4.5 G/DL (ref 3.5–5)
ALP SERPL-CCNC: 48 U/L (ref 34–104)
ALT SERPL W P-5'-P-CCNC: 29 U/L (ref 7–52)
ANION GAP SERPL CALCULATED.3IONS-SCNC: 9 MMOL/L (ref 4–13)
AST SERPL W P-5'-P-CCNC: 12 U/L (ref 13–39)
BILIRUB SERPL-MCNC: 1.28 MG/DL (ref 0.2–1)
BUN SERPL-MCNC: 13 MG/DL (ref 5–25)
CALCIUM SERPL-MCNC: 8.9 MG/DL (ref 8.4–10.2)
CHLORIDE SERPL-SCNC: 101 MMOL/L (ref 96–108)
CHOLEST SERPL-MCNC: 129 MG/DL (ref ?–200)
CO2 SERPL-SCNC: 28 MMOL/L (ref 21–32)
CREAT SERPL-MCNC: 0.65 MG/DL (ref 0.6–1.3)
CREAT UR-MCNC: 101.7 MG/DL
EST. AVERAGE GLUCOSE BLD GHB EST-MCNC: 143 MG/DL
GFR SERPL CREATININE-BSD FRML MDRD: 101 ML/MIN/1.73SQ M
GLUCOSE P FAST SERPL-MCNC: 184 MG/DL (ref 65–99)
HBA1C MFR BLD: 6.6 %
HDLC SERPL-MCNC: 43 MG/DL
LDLC SERPL CALC-MCNC: 58 MG/DL (ref 0–100)
NONHDLC SERPL-MCNC: 86 MG/DL
POTASSIUM SERPL-SCNC: 4 MMOL/L (ref 3.5–5.3)
PROT SERPL-MCNC: 6.8 G/DL (ref 6.4–8.4)
PROT UR-MCNC: 28.8 MG/DL
PROT/CREAT UR: 0.3 MG/G{CREAT}
SODIUM SERPL-SCNC: 138 MMOL/L (ref 135–147)
TRIGL SERPL-MCNC: 139 MG/DL (ref ?–150)
TSH SERPL DL<=0.05 MIU/L-ACNC: 16.29 UIU/ML (ref 0.45–4.5)

## 2025-04-17 PROCEDURE — 84156 ASSAY OF PROTEIN URINE: CPT

## 2025-04-17 PROCEDURE — 36415 COLL VENOUS BLD VENIPUNCTURE: CPT

## 2025-04-17 PROCEDURE — 82570 ASSAY OF URINE CREATININE: CPT

## 2025-04-17 PROCEDURE — 83036 HEMOGLOBIN GLYCOSYLATED A1C: CPT

## 2025-04-17 PROCEDURE — 80061 LIPID PANEL: CPT

## 2025-04-17 PROCEDURE — 84443 ASSAY THYROID STIM HORMONE: CPT

## 2025-04-17 PROCEDURE — 80053 COMPREHEN METABOLIC PANEL: CPT

## (undated) DEVICE — PROBE PTEYE-1 FIBER OPTIC: Brand: PTEYE

## (undated) DEVICE — SUT MONOCRYL 4-0 PS-2 18 IN Y496G

## (undated) DEVICE — INTENDED FOR TISSUE SEPARATION, AND OTHER PROCEDURES THAT REQUIRE A SHARP SURGICAL BLADE TO PUNCTURE OR CUT.: Brand: BARD-PARKER SAFETY BLADES SIZE 15, STERILE

## (undated) DEVICE — PACK UNIVERSAL NECK

## (undated) DEVICE — GLOVE SRG BIOGEL ORTHOPEDIC 7

## (undated) DEVICE — NEEDLE 25G X 1 1/2

## (undated) DEVICE — LIGHT HANDLE COVER SLEEVE DISP BLUE STELLAR

## (undated) DEVICE — TIBURON SPLIT SHEET: Brand: CONVERTORS

## (undated) DEVICE — BIPOLAR CORD DISP

## (undated) DEVICE — SUT SILK 3-0 18 IN A184H

## (undated) DEVICE — 3M™ STERI-STRIP™ REINFORCED ADHESIVE SKIN CLOSURES, R1542, 1/4 IN X 1-1/2 IN (6 MM X 38 MM), 6 STRIPS/ENVELOPE: Brand: 3M™ STERI-STRIP™

## (undated) DEVICE — SYRINGE 10ML LL

## (undated) DEVICE — VIAL DECANTER

## (undated) DEVICE — 3M™ STERI-STRIP™ REINFORCED ADHESIVE SKIN CLOSURES, R1547, 1/2 IN X 4 IN (12 MM X 100 MM), 6 STRIPS/ENVELOPE: Brand: 3M™ STERI-STRIP™

## (undated) DEVICE — CHLORAPREP HI-LITE 26ML ORANGE

## (undated) DEVICE — SUT VICRYL 3-0 SH 27 IN J416H

## (undated) DEVICE — SUT SILK 3-0 SH 30 IN K832H

## (undated) DEVICE — SURGICEL 4 X 8

## (undated) DEVICE — EMG TUBE 8229708 NIM TRIVANTAGE 8.0MM ID: Brand: NIM TRIVANTAGE™